# Patient Record
Sex: FEMALE | NOT HISPANIC OR LATINO | URBAN - METROPOLITAN AREA
[De-identification: names, ages, dates, MRNs, and addresses within clinical notes are randomized per-mention and may not be internally consistent; named-entity substitution may affect disease eponyms.]

---

## 2023-10-02 ENCOUNTER — NURSING HOME VISIT (OUTPATIENT)
Dept: POST ACUTE CARE | Facility: EXTERNAL LOCATION | Age: 73
End: 2023-10-02
Payer: COMMERCIAL

## 2023-10-02 DIAGNOSIS — I10 PRIMARY HYPERTENSION: ICD-10-CM

## 2023-10-02 DIAGNOSIS — N18.31 TYPE 2 DIABETES MELLITUS WITH STAGE 3A CHRONIC KIDNEY DISEASE, WITHOUT LONG-TERM CURRENT USE OF INSULIN (MULTI): Primary | ICD-10-CM

## 2023-10-02 DIAGNOSIS — E78.2 MIXED HYPERLIPIDEMIA: ICD-10-CM

## 2023-10-02 DIAGNOSIS — E11.22 TYPE 2 DIABETES MELLITUS WITH STAGE 3A CHRONIC KIDNEY DISEASE, WITHOUT LONG-TERM CURRENT USE OF INSULIN (MULTI): Primary | ICD-10-CM

## 2023-10-02 PROCEDURE — 99308 SBSQ NF CARE LOW MDM 20: CPT | Performed by: FAMILY MEDICINE

## 2023-11-03 ENCOUNTER — NURSING HOME VISIT (OUTPATIENT)
Dept: PRIMARY CARE | Facility: CLINIC | Age: 73
End: 2023-11-03
Payer: COMMERCIAL

## 2023-11-03 DIAGNOSIS — N18.32 TYPE 2 DIABETES MELLITUS WITH STAGE 3B CHRONIC KIDNEY DISEASE, WITH LONG-TERM CURRENT USE OF INSULIN (MULTI): Primary | ICD-10-CM

## 2023-11-03 DIAGNOSIS — F31.75 BIPOLAR DISORDER, IN PARTIAL REMISSION, MOST RECENT EPISODE DEPRESSED (MULTI): ICD-10-CM

## 2023-11-03 DIAGNOSIS — I89.0 LYMPHEDEMA: ICD-10-CM

## 2023-11-03 DIAGNOSIS — E11.22 TYPE 2 DIABETES MELLITUS WITH STAGE 3B CHRONIC KIDNEY DISEASE, WITH LONG-TERM CURRENT USE OF INSULIN (MULTI): Primary | ICD-10-CM

## 2023-11-03 DIAGNOSIS — I10 PRIMARY HYPERTENSION: ICD-10-CM

## 2023-11-03 DIAGNOSIS — Z79.4 TYPE 2 DIABETES MELLITUS WITH STAGE 3B CHRONIC KIDNEY DISEASE, WITH LONG-TERM CURRENT USE OF INSULIN (MULTI): Primary | ICD-10-CM

## 2023-11-03 PROCEDURE — 99309 SBSQ NF CARE MODERATE MDM 30: CPT | Performed by: FAMILY MEDICINE

## 2023-11-07 PROBLEM — F31.9 BIPOLAR MOOD DISORDER (MULTI): Status: ACTIVE | Noted: 2023-11-07

## 2023-11-07 PROBLEM — E78.5 HYPERLIPIDEMIA: Status: ACTIVE | Noted: 2023-11-07

## 2023-11-07 PROBLEM — I89.0 LYMPHEDEMA: Status: ACTIVE | Noted: 2023-11-07

## 2023-11-07 PROBLEM — I10 HYPERTENSION: Status: ACTIVE | Noted: 2023-11-07

## 2023-11-07 NOTE — PROGRESS NOTES
Subjective   Patient ID: Brianna Argueta is a 73 y.o. female who presents for No chief complaint on file..    HPI     Review of Systems    Objective   There were no vitals taken for this visit.    Physical Exam    Assessment/Plan   Problem List Items Addressed This Visit             ICD-10-CM    Bipolar mood disorder (CMS/[facility]) F31.9    Hypertension I10    Lymphedema I89.0     Other Visit Diagnoses         Codes    Type 2 diabetes mellitus with stage 3b chronic kidney disease, with long-term current use of insulin (CMS/[facility])    -  Primary E11.22, N18.32, Z79.4           This note is for billing purposes only.  See note at [facility] for complete documentation.

## 2023-12-01 ENCOUNTER — NURSING HOME VISIT (OUTPATIENT)
Dept: POST ACUTE CARE | Facility: EXTERNAL LOCATION | Age: 73
End: 2023-12-01
Payer: COMMERCIAL

## 2023-12-01 DIAGNOSIS — N18.32 TYPE 2 DIABETES MELLITUS WITH STAGE 3B CHRONIC KIDNEY DISEASE, WITHOUT LONG-TERM CURRENT USE OF INSULIN (MULTI): ICD-10-CM

## 2023-12-01 DIAGNOSIS — I10 PRIMARY HYPERTENSION: ICD-10-CM

## 2023-12-01 DIAGNOSIS — E78.2 MIXED HYPERLIPIDEMIA: ICD-10-CM

## 2023-12-01 DIAGNOSIS — N18.32 STAGE 3B CHRONIC KIDNEY DISEASE (MULTI): Primary | ICD-10-CM

## 2023-12-01 DIAGNOSIS — F31.75 BIPOLAR DISORDER, IN PARTIAL REMISSION, MOST RECENT EPISODE DEPRESSED (MULTI): ICD-10-CM

## 2023-12-01 DIAGNOSIS — E11.22 TYPE 2 DIABETES MELLITUS WITH STAGE 3B CHRONIC KIDNEY DISEASE, WITHOUT LONG-TERM CURRENT USE OF INSULIN (MULTI): ICD-10-CM

## 2023-12-01 PROCEDURE — 99309 SBSQ NF CARE MODERATE MDM 30: CPT | Performed by: FAMILY MEDICINE

## 2023-12-04 NOTE — PROGRESS NOTES
Subjective   Patient ID: Brianna Argueta is a 73 y.o. female who presents for long term visit.   HPI     Review of Systems    Objective   There were no vitals taken for this visit.    Physical Exam    Assessment/Plan   Problem List Items Addressed This Visit             ICD-10-CM    Bipolar mood disorder (CMS/[facility]) F31.9    Hyperlipidemia E78.5    Hypertension I10     Other Visit Diagnoses         Codes    Stage 3b chronic kidney disease (CMS/[facility])    -  Primary N18.32    Type 2 diabetes mellitus with stage 3b chronic kidney disease, without long-term current use of insulin (CMS/[facility])     E11.22, N18.32          Note is for billing purposes only see note at long-term care facility for complete documentation.

## 2024-01-05 ENCOUNTER — NURSING HOME VISIT (OUTPATIENT)
Dept: POST ACUTE CARE | Facility: EXTERNAL LOCATION | Age: 74
End: 2024-01-05
Payer: COMMERCIAL

## 2024-01-05 DIAGNOSIS — Z79.4 TYPE 2 DIABETES MELLITUS WITH STAGE 3B CHRONIC KIDNEY DISEASE, WITH LONG-TERM CURRENT USE OF INSULIN (MULTI): ICD-10-CM

## 2024-01-05 DIAGNOSIS — I10 PRIMARY HYPERTENSION: ICD-10-CM

## 2024-01-05 DIAGNOSIS — L03.115 BILATERAL LOWER LEG CELLULITIS: Primary | ICD-10-CM

## 2024-01-05 DIAGNOSIS — N18.32 STAGE 3B CHRONIC KIDNEY DISEASE (MULTI): ICD-10-CM

## 2024-01-05 DIAGNOSIS — N18.32 TYPE 2 DIABETES MELLITUS WITH STAGE 3B CHRONIC KIDNEY DISEASE, WITH LONG-TERM CURRENT USE OF INSULIN (MULTI): ICD-10-CM

## 2024-01-05 DIAGNOSIS — E11.22 TYPE 2 DIABETES MELLITUS WITH STAGE 3B CHRONIC KIDNEY DISEASE, WITH LONG-TERM CURRENT USE OF INSULIN (MULTI): ICD-10-CM

## 2024-01-05 DIAGNOSIS — L03.116 BILATERAL LOWER LEG CELLULITIS: Primary | ICD-10-CM

## 2024-01-05 PROCEDURE — 99309 SBSQ NF CARE MODERATE MDM 30: CPT | Performed by: FAMILY MEDICINE

## 2024-01-05 NOTE — LETTER
Patient: Brianna Argueta  : 1950    Encounter Date: 2024    Subjective  Patient ID: Brianna Argueta is a 73 y.o. female who is long term resident being seen and evaluated for multiple medical problems.    HPI     Review of Systems    Objective  There were no vitals taken for this visit.    Physical Exam    Assessment/Plan  Problem List Items Addressed This Visit             ICD-10-CM    Hypertension I10     Other Visit Diagnoses         Codes    Bilateral lower leg cellulitis    -  Primary L03.116, L03.115    Type 2 diabetes mellitus with stage 3b chronic kidney disease, with long-term current use of insulin (CMS/[facility])     E11.22, N18.32, Z79.4    Stage 3b chronic kidney disease (CMS/[facility])     N18.32        This note is for billing purposes only. For complete documentation, please see note at [facility] care facility.      Goals    None           Electronically Signed By: Mari Peters MD   24  1:40 PM

## 2024-01-08 NOTE — PROGRESS NOTES
Subjective   Patient ID: Brianna Argueta is a 73 y.o. female who is long term resident being seen and evaluated for multiple medical problems.    HPI     Review of Systems    Objective   There were no vitals taken for this visit.    Physical Exam    Assessment/Plan   Problem List Items Addressed This Visit             ICD-10-CM    Hypertension I10     Other Visit Diagnoses         Codes    Bilateral lower leg cellulitis    -  Primary L03.116, L03.115    Type 2 diabetes mellitus with stage 3b chronic kidney disease, with long-term current use of insulin (CMS/[facility])     E11.22, N18.32, Z79.4    Stage 3b chronic kidney disease (CMS/[facility])     N18.32        This note is for billing purposes only. For complete documentation, please see note at [facility] care facility.      Goals    None

## 2024-02-02 ENCOUNTER — NURSING HOME VISIT (OUTPATIENT)
Dept: POST ACUTE CARE | Facility: EXTERNAL LOCATION | Age: 74
End: 2024-02-02
Payer: COMMERCIAL

## 2024-02-02 DIAGNOSIS — R31.0 GROSS HEMATURIA: Primary | ICD-10-CM

## 2024-02-02 DIAGNOSIS — Z79.4 TYPE 2 DIABETES MELLITUS WITH STAGE 3B CHRONIC KIDNEY DISEASE, WITH LONG-TERM CURRENT USE OF INSULIN (MULTI): ICD-10-CM

## 2024-02-02 DIAGNOSIS — N18.32 STAGE 3B CHRONIC KIDNEY DISEASE (MULTI): ICD-10-CM

## 2024-02-02 DIAGNOSIS — N18.32 TYPE 2 DIABETES MELLITUS WITH STAGE 3B CHRONIC KIDNEY DISEASE, WITH LONG-TERM CURRENT USE OF INSULIN (MULTI): ICD-10-CM

## 2024-02-02 DIAGNOSIS — E11.22 TYPE 2 DIABETES MELLITUS WITH STAGE 3B CHRONIC KIDNEY DISEASE, WITH LONG-TERM CURRENT USE OF INSULIN (MULTI): ICD-10-CM

## 2024-02-02 PROCEDURE — 99309 SBSQ NF CARE MODERATE MDM 30: CPT | Performed by: FAMILY MEDICINE

## 2024-02-02 NOTE — LETTER
Patient: Brianna Argueta  : 1950    Encounter Date: 2024    Subjective  Patient ID: Brianna Argueta is a 74 y.o. female who is long term resident being seen and evaluated for multiple medical problems.    HPI     Review of Systems    Objective  There were no vitals taken for this visit.    Physical Exam    Assessment/Plan  Problem List Items Addressed This Visit    None  Visit Diagnoses         Codes    Gross hematuria    -  Primary R31.0    Type 2 diabetes mellitus with stage 3b chronic kidney disease, with long-term current use of insulin (CMS/[facility])     E11.22, N18.32, Z79.4    Stage 3b chronic kidney disease (CMS/[facility])     N18.32        This note is for billing purposes only. For complete documentation please see note at facility.      Goals    None           Electronically Signed By: Mari Peters MD   24  2:26 PM

## 2024-02-05 NOTE — PROGRESS NOTES
Subjective   Patient ID: Brianna Argueta is a 74 y.o. female who is long term resident being seen and evaluated for multiple medical problems.    HPI     Review of Systems    Objective   There were no vitals taken for this visit.    Physical Exam    Assessment/Plan   Problem List Items Addressed This Visit    None  Visit Diagnoses         Codes    Gross hematuria    -  Primary R31.0    Type 2 diabetes mellitus with stage 3b chronic kidney disease, with long-term current use of insulin (CMS/[facility])     E11.22, N18.32, Z79.4    Stage 3b chronic kidney disease (CMS/[facility])     N18.32        This note is for billing purposes only. For complete documentation please see note at facility.      Goals    None

## 2024-03-01 ENCOUNTER — NURSING HOME VISIT (OUTPATIENT)
Dept: POST ACUTE CARE | Facility: EXTERNAL LOCATION | Age: 74
End: 2024-03-01
Payer: COMMERCIAL

## 2024-03-01 DIAGNOSIS — F20.0 PARANOID SCHIZOPHRENIA (MULTI): Primary | ICD-10-CM

## 2024-03-01 DIAGNOSIS — E78.2 MIXED HYPERLIPIDEMIA: ICD-10-CM

## 2024-03-01 DIAGNOSIS — N18.31 STAGE 3A CHRONIC KIDNEY DISEASE (MULTI): ICD-10-CM

## 2024-03-01 DIAGNOSIS — I10 PRIMARY HYPERTENSION: ICD-10-CM

## 2024-03-01 DIAGNOSIS — I89.0 LYMPHEDEMA: ICD-10-CM

## 2024-03-01 PROCEDURE — 99309 SBSQ NF CARE MODERATE MDM 30: CPT | Performed by: FAMILY MEDICINE

## 2024-03-01 NOTE — LETTER
Patient: Brianna Argueta  : 1950    Encounter Date: 2024    Subjective  Patient ID: Brianna Argueta is a 74 y.o. female who is long term resident being seen and evaluated for multiple medical problems.    HPI     Review of Systems    Objective  There were no vitals taken for this visit.    Physical Exam    Assessment/Plan  Problem List Items Addressed This Visit             ICD-10-CM    Hyperlipidemia E78.5    Hypertension I10    Lymphedema I89.0    Paranoid schizophrenia (CMS/[facility]) - Primary F20.0     Other Visit Diagnoses         Codes    Stage 3a chronic kidney disease (CMS/[facility])     N18.31        This note is being done for billing purposes only.  For complete documentation please see note at the facility.     Goals    None           Electronically Signed By: Mari Peters MD   3/4/24 12:41 PM

## 2024-03-04 PROBLEM — F20.0 PARANOID SCHIZOPHRENIA (MULTI): Status: ACTIVE | Noted: 2024-03-04

## 2024-03-04 NOTE — PROGRESS NOTES
Subjective   Patient ID: Brianna Argueta is a 74 y.o. female who is long term resident being seen and evaluated for multiple medical problems.    HPI     Review of Systems    Objective   There were no vitals taken for this visit.    Physical Exam    Assessment/Plan   Problem List Items Addressed This Visit             ICD-10-CM    Hyperlipidemia E78.5    Hypertension I10    Lymphedema I89.0    Paranoid schizophrenia (CMS/[facility]) - Primary F20.0     Other Visit Diagnoses         Codes    Stage 3a chronic kidney disease (CMS/[facility])     N18.31        This note is being done for billing purposes only.  For complete documentation please see note at the facility.     Goals    None

## 2024-04-01 ENCOUNTER — NURSING HOME VISIT (OUTPATIENT)
Dept: POST ACUTE CARE | Facility: EXTERNAL LOCATION | Age: 74
End: 2024-04-01
Payer: COMMERCIAL

## 2024-04-01 DIAGNOSIS — Z79.4 TYPE 2 DIABETES MELLITUS WITH HYPERGLYCEMIA, WITH LONG-TERM CURRENT USE OF INSULIN (MULTI): Primary | ICD-10-CM

## 2024-04-01 DIAGNOSIS — I10 PRIMARY HYPERTENSION: ICD-10-CM

## 2024-04-01 DIAGNOSIS — F31.75 BIPOLAR DISORDER, IN PARTIAL REMISSION, MOST RECENT EPISODE DEPRESSED (MULTI): ICD-10-CM

## 2024-04-01 DIAGNOSIS — E11.65 TYPE 2 DIABETES MELLITUS WITH HYPERGLYCEMIA, WITH LONG-TERM CURRENT USE OF INSULIN (MULTI): Primary | ICD-10-CM

## 2024-04-01 DIAGNOSIS — N18.32 STAGE 3B CHRONIC KIDNEY DISEASE (MULTI): ICD-10-CM

## 2024-04-01 DIAGNOSIS — F20.0 PARANOID SCHIZOPHRENIA (MULTI): ICD-10-CM

## 2024-04-01 DIAGNOSIS — E78.2 MIXED HYPERLIPIDEMIA: ICD-10-CM

## 2024-04-01 PROCEDURE — 99309 SBSQ NF CARE MODERATE MDM 30: CPT | Performed by: FAMILY MEDICINE

## 2024-04-01 NOTE — LETTER
Patient: Brianna Argueta  : 1950    Encounter Date: 2024    Subjective  Patient ID: Brianna Argueta is a 74 y.o. female who is long term resident being seen and evaluated for multiple medical problems.    HPI     Review of Systems    Objective  There were no vitals taken for this visit.    Physical Exam    Assessment/Plan  Problem List Items Addressed This Visit             ICD-10-CM    Bipolar mood disorder (CMS/[facility]) F31.9    Hyperlipidemia E78.5    Hypertension I10    Paranoid schizophrenia (CMS/[facility]) F20.0     Other Visit Diagnoses         Codes    Type 2 diabetes mellitus with hyperglycemia, with long-term current use of insulin (CMS/[facility])    -  Primary E11.65, Z79.4    Stage 3b chronic kidney disease (CMS/[facility])     N18.32        This note is for billing purposes only. For complete documentation please see note at the facility.    Goals         Goals    None           Electronically Signed By: Mari Peters MD   24  1:17 PM

## 2024-04-02 NOTE — PROGRESS NOTES
Subjective   Patient ID: Brianna Argueta is a 74 y.o. female who is long term resident being seen and evaluated for multiple medical problems.    HPI     Review of Systems    Objective   There were no vitals taken for this visit.    Physical Exam    Assessment/Plan   Problem List Items Addressed This Visit             ICD-10-CM    Bipolar mood disorder (CMS/[facility]) F31.9    Hyperlipidemia E78.5    Hypertension I10    Paranoid schizophrenia (CMS/[facility]) F20.0     Other Visit Diagnoses         Codes    Type 2 diabetes mellitus with hyperglycemia, with long-term current use of insulin (CMS/[facility])    -  Primary E11.65, Z79.4    Stage 3b chronic kidney disease (CMS/[facility])     N18.32        This note is for billing purposes only. For complete documentation please see note at the facility.    Goals         Goals    None

## 2024-05-03 ENCOUNTER — NURSING HOME VISIT (OUTPATIENT)
Dept: POST ACUTE CARE | Facility: EXTERNAL LOCATION | Age: 74
End: 2024-05-03
Payer: COMMERCIAL

## 2024-05-03 DIAGNOSIS — E78.2 MIXED HYPERLIPIDEMIA: ICD-10-CM

## 2024-05-03 DIAGNOSIS — D63.1 ANEMIA DUE TO STAGE 3A CHRONIC KIDNEY DISEASE (MULTI): ICD-10-CM

## 2024-05-03 DIAGNOSIS — I10 PRIMARY HYPERTENSION: ICD-10-CM

## 2024-05-03 DIAGNOSIS — E11.65 TYPE 2 DIABETES MELLITUS WITH HYPERGLYCEMIA, WITH LONG-TERM CURRENT USE OF INSULIN (MULTI): Primary | ICD-10-CM

## 2024-05-03 DIAGNOSIS — Z79.4 TYPE 2 DIABETES MELLITUS WITH HYPERGLYCEMIA, WITH LONG-TERM CURRENT USE OF INSULIN (MULTI): Primary | ICD-10-CM

## 2024-05-03 DIAGNOSIS — N18.31 STAGE 3A CHRONIC KIDNEY DISEASE (MULTI): ICD-10-CM

## 2024-05-03 DIAGNOSIS — N18.31 ANEMIA DUE TO STAGE 3A CHRONIC KIDNEY DISEASE (MULTI): ICD-10-CM

## 2024-05-03 DIAGNOSIS — F20.0 PARANOID SCHIZOPHRENIA (MULTI): ICD-10-CM

## 2024-05-03 PROCEDURE — 99309 SBSQ NF CARE MODERATE MDM 30: CPT | Performed by: FAMILY MEDICINE

## 2024-05-03 NOTE — LETTER
Patient: Brianna Argueta  : 1950    Encounter Date: 2024    Subjective  Patient ID: Brianna Argueta is a 74 y.o. female who is long term resident being seen and evaluated for multiple medical problems.    HPI     Review of Systems    Objective  There were no vitals taken for this visit.    Physical Exam    Assessment/Plan  Problem List Items Addressed This Visit             ICD-10-CM    Hyperlipidemia E78.5    Hypertension I10    Paranoid schizophrenia (Multi) F20.0     Other Visit Diagnoses         Codes    Type 2 diabetes mellitus with hyperglycemia, with long-term current use of insulin (Multi)    -  Primary E11.65, Z79.4    Stage 3a chronic kidney disease (Multi)     N18.31    Anemia due to stage 3a chronic kidney disease (Multi)     N18.31, D63.1        Please see complete documentation scanned under media      Goals    None           Electronically Signed By: Mari Peters MD   24 12:24 PM

## 2024-05-06 NOTE — PROGRESS NOTES
Subjective   Patient ID: Brianna Argueta is a 74 y.o. female who is long term resident being seen and evaluated for multiple medical problems.    HPI     Review of Systems    Objective   There were no vitals taken for this visit.    Physical Exam    Assessment/Plan   Problem List Items Addressed This Visit             ICD-10-CM    Hyperlipidemia E78.5    Hypertension I10    Paranoid schizophrenia (Multi) F20.0     Other Visit Diagnoses         Codes    Type 2 diabetes mellitus with hyperglycemia, with long-term current use of insulin (Multi)    -  Primary E11.65, Z79.4    Stage 3a chronic kidney disease (Multi)     N18.31    Anemia due to stage 3a chronic kidney disease (Multi)     N18.31, D63.1        Please see complete documentation scanned under media      Goals    None

## 2024-06-03 ENCOUNTER — NURSING HOME VISIT (OUTPATIENT)
Dept: POST ACUTE CARE | Facility: EXTERNAL LOCATION | Age: 74
End: 2024-06-03
Payer: COMMERCIAL

## 2024-06-03 DIAGNOSIS — Z79.4 TYPE 2 DIABETES MELLITUS WITH STAGE 3B CHRONIC KIDNEY DISEASE, WITH LONG-TERM CURRENT USE OF INSULIN (MULTI): ICD-10-CM

## 2024-06-03 DIAGNOSIS — I89.0 LYMPHEDEMA: Primary | ICD-10-CM

## 2024-06-03 DIAGNOSIS — I10 PRIMARY HYPERTENSION: ICD-10-CM

## 2024-06-03 DIAGNOSIS — E11.22 TYPE 2 DIABETES MELLITUS WITH STAGE 3B CHRONIC KIDNEY DISEASE, WITH LONG-TERM CURRENT USE OF INSULIN (MULTI): ICD-10-CM

## 2024-06-03 DIAGNOSIS — N18.32 TYPE 2 DIABETES MELLITUS WITH STAGE 3B CHRONIC KIDNEY DISEASE, WITH LONG-TERM CURRENT USE OF INSULIN (MULTI): ICD-10-CM

## 2024-06-03 DIAGNOSIS — F20.0 PARANOID SCHIZOPHRENIA (MULTI): ICD-10-CM

## 2024-06-03 PROCEDURE — 99309 SBSQ NF CARE MODERATE MDM 30: CPT | Performed by: FAMILY MEDICINE

## 2024-06-03 NOTE — LETTER
Patient: Brianna Argueta  : 1950    Encounter Date: 2024    Subjective  Patient ID: Brianna Argueta is a 74 y.o. female who is long term resident being seen and evaluated for multiple medical problems.    HPI patient being seen for monthly visit.  She did have laboratory studies  since last visit that showed A1c of 7.6 with hemoglobin 12.7 and chronic mild elevation in white count noted consistent with her recurrent cellulitic issues.  GFR has remained stable at 54.  She continues to exhibit paranoid schizophrenic symptoms and psychiatry is following.  For recheck laboratory studies in August or possibly defer until September.     Review of Systems   Constitutional:  Positive for fatigue. Negative for chills and fever.   HENT:  Positive for hearing loss. Negative for congestion and sore throat.    Eyes:  Negative for visual disturbance.   Respiratory:  Positive for shortness of breath (At baseline). Negative for cough and wheezing.    Cardiovascular:  Positive for leg swelling. Negative for chest pain and palpitations.   Gastrointestinal:  Negative for abdominal pain, constipation, diarrhea, nausea and vomiting.   Genitourinary:  Negative for dysuria, frequency, hematuria and urgency.   Musculoskeletal:  Positive for gait problem.   Skin:  Negative for rash.   Neurological:  Negative for dizziness, syncope and headaches.   Psychiatric/Behavioral:  Positive for confusion. Negative for dysphoric mood. The patient is not nervous/anxious.        Objective  There were no vitals taken for this visit.    Physical Exam  Vitals reviewed: 128/78 temp 97.8 pulse 78 respirations 18 weight 209.   Constitutional:       General: She is not in acute distress.     Appearance: She is not ill-appearing.   HENT:      Head: Normocephalic.      Nose: No congestion.      Mouth/Throat:      Mouth: Mucous membranes are dry.      Pharynx: Oropharynx is clear.   Eyes:      General: No scleral icterus.     Conjunctiva/sclera:  Conjunctivae normal.   Cardiovascular:      Rate and Rhythm: Normal rate and regular rhythm.      Heart sounds:      No gallop.   Pulmonary:      Effort: Pulmonary effort is normal.      Breath sounds: No wheezing or rhonchi.   Abdominal:      General: Bowel sounds are normal.      Tenderness: There is no abdominal tenderness. There is no rebound.   Musculoskeletal:      Cervical back: Neck supple.      Right lower leg: Edema present.      Left lower leg: Edema present.   Lymphadenopathy:      Cervical: No cervical adenopathy.   Skin:     General: Skin is warm and dry.   Neurological:      General: No focal deficit present.         Assessment/Plan  Problem List Items Addressed This Visit             ICD-10-CM    Hypertension I10    Lymphedema - Primary I89.0    Paranoid schizophrenia (Multi) F20.0     Other Visit Diagnoses         Codes    Type 2 diabetes mellitus with stage 3b chronic kidney disease, with long-term current use of insulin (Multi)     E11.22, N18.32, Z79.4        Follow-up with psychiatry regarding any medication adjustments needed for paranoid schizophrenia, monitor GFR which shows some slight improvement with improved diabetic management noted as well.  Plan for recheck laboratory studies in August or possibly defer until September.     Goals    None           Electronically Signed By: Mari Peters MD   6/4/24 12:48 PM   chills

## 2024-06-04 ASSESSMENT — ENCOUNTER SYMPTOMS
VOMITING: 0
DYSURIA: 0
CONSTIPATION: 0
COUGH: 0
NAUSEA: 0
DIZZINESS: 0
SORE THROAT: 0
PALPITATIONS: 0
NERVOUS/ANXIOUS: 0
SHORTNESS OF BREATH: 1
HEMATURIA: 0
DIARRHEA: 0
HEADACHES: 0
FEVER: 0
WHEEZING: 0
FATIGUE: 1
CHILLS: 0
DYSPHORIC MOOD: 0
CONFUSION: 1
ABDOMINAL PAIN: 0
FREQUENCY: 0

## 2024-06-04 NOTE — PROGRESS NOTES
Subjective   Patient ID: Brianna Argueta is a 74 y.o. female who is long term resident being seen and evaluated for multiple medical problems.    HPI patient being seen for monthly visit.  She did have laboratory studies  since last visit that showed A1c of 7.6 with hemoglobin 12.7 and chronic mild elevation in white count noted consistent with her recurrent cellulitic issues.  GFR has remained stable at 54.  She continues to exhibit paranoid schizophrenic symptoms and psychiatry is following.  For recheck laboratory studies in August or possibly defer until September.     Review of Systems   Constitutional:  Positive for fatigue. Negative for chills and fever.   HENT:  Positive for hearing loss. Negative for congestion and sore throat.    Eyes:  Negative for visual disturbance.   Respiratory:  Positive for shortness of breath (At baseline). Negative for cough and wheezing.    Cardiovascular:  Positive for leg swelling. Negative for chest pain and palpitations.   Gastrointestinal:  Negative for abdominal pain, constipation, diarrhea, nausea and vomiting.   Genitourinary:  Negative for dysuria, frequency, hematuria and urgency.   Musculoskeletal:  Positive for gait problem.   Skin:  Negative for rash.   Neurological:  Negative for dizziness, syncope and headaches.   Psychiatric/Behavioral:  Positive for confusion. Negative for dysphoric mood. The patient is not nervous/anxious.        Objective   There were no vitals taken for this visit.    Physical Exam  Vitals reviewed: 128/78 temp 97.8 pulse 78 respirations 18 weight 209.   Constitutional:       General: She is not in acute distress.     Appearance: She is not ill-appearing.   HENT:      Head: Normocephalic.      Nose: No congestion.      Mouth/Throat:      Mouth: Mucous membranes are dry.      Pharynx: Oropharynx is clear.   Eyes:      General: No scleral icterus.     Conjunctiva/sclera: Conjunctivae normal.   Cardiovascular:      Rate and Rhythm: Normal rate and  regular rhythm.      Heart sounds:      No gallop.   Pulmonary:      Effort: Pulmonary effort is normal.      Breath sounds: No wheezing or rhonchi.   Abdominal:      General: Bowel sounds are normal.      Tenderness: There is no abdominal tenderness. There is no rebound.   Musculoskeletal:      Cervical back: Neck supple.      Right lower leg: Edema present.      Left lower leg: Edema present.   Lymphadenopathy:      Cervical: No cervical adenopathy.   Skin:     General: Skin is warm and dry.   Neurological:      General: No focal deficit present.         Assessment/Plan   Problem List Items Addressed This Visit             ICD-10-CM    Hypertension I10    Lymphedema - Primary I89.0    Paranoid schizophrenia (Multi) F20.0     Other Visit Diagnoses         Codes    Type 2 diabetes mellitus with stage 3b chronic kidney disease, with long-term current use of insulin (Multi)     E11.22, N18.32, Z79.4        Follow-up with psychiatry regarding any medication adjustments needed for paranoid schizophrenia, monitor GFR which shows some slight improvement with improved diabetic management noted as well.  Plan for recheck laboratory studies in August or possibly defer until September.     Goals    None

## 2024-07-01 ENCOUNTER — NURSING HOME VISIT (OUTPATIENT)
Dept: POST ACUTE CARE | Facility: EXTERNAL LOCATION | Age: 74
End: 2024-07-01
Payer: COMMERCIAL

## 2024-07-01 DIAGNOSIS — I89.0 LYMPHEDEMA: Primary | ICD-10-CM

## 2024-07-01 DIAGNOSIS — F20.0 PARANOID SCHIZOPHRENIA (MULTI): ICD-10-CM

## 2024-07-01 DIAGNOSIS — E78.2 MIXED HYPERLIPIDEMIA: ICD-10-CM

## 2024-07-01 DIAGNOSIS — N18.32 TYPE 2 DIABETES MELLITUS WITH STAGE 3B CHRONIC KIDNEY DISEASE, WITH LONG-TERM CURRENT USE OF INSULIN (MULTI): ICD-10-CM

## 2024-07-01 DIAGNOSIS — I10 PRIMARY HYPERTENSION: ICD-10-CM

## 2024-07-01 DIAGNOSIS — Z79.4 TYPE 2 DIABETES MELLITUS WITH STAGE 3B CHRONIC KIDNEY DISEASE, WITH LONG-TERM CURRENT USE OF INSULIN (MULTI): ICD-10-CM

## 2024-07-01 DIAGNOSIS — E11.22 TYPE 2 DIABETES MELLITUS WITH STAGE 3B CHRONIC KIDNEY DISEASE, WITH LONG-TERM CURRENT USE OF INSULIN (MULTI): ICD-10-CM

## 2024-07-01 PROCEDURE — 99309 SBSQ NF CARE MODERATE MDM 30: CPT | Performed by: FAMILY MEDICINE

## 2024-07-01 NOTE — LETTER
Patient: Brianna Argueta  : 1950    Encounter Date: 2024    Subjective  Patient ID: Brianna Argueta is a 74 y.o. female who is long term resident being seen and evaluated for multiple medical problems.    HPI patient being seen for monthly visit.  She did have laboratory studies  since last visit that showed A1c of 7.6 with hemoglobin 12.7 and chronic mild elevation in white count noted consistent with her recurrent cellulitic issues.  GFR has remained stable at 54.  She continues to exhibit paranoid schizophrenic symptoms and psychiatry is following.  She has rechecks ordered for laboratory studies including CBC CMP and A1c.  Staff reports no other new issues or concerns today.      Review of Systems   Constitutional:  Positive for fatigue. Negative for chills and fever.   HENT:  Positive for hearing loss. Negative for congestion and sore throat.    Eyes:  Negative for visual disturbance.   Respiratory:  Negative for cough, shortness of breath (At baseline) and wheezing.    Cardiovascular:  Positive for leg swelling. Negative for chest pain and palpitations.   Gastrointestinal:  Negative for abdominal pain, constipation, diarrhea, nausea and vomiting.   Genitourinary:  Negative for dysuria, frequency, hematuria and urgency.   Musculoskeletal:  Positive for gait problem.   Skin:  Negative for rash.   Neurological:  Negative for dizziness, syncope and headaches.   Psychiatric/Behavioral:  Positive for confusion. Negative for dysphoric mood. The patient is not nervous/anxious.        Objective  There were no vitals taken for this visit.    Physical Exam  Vitals reviewed: 128/78 temp 97.8 pulse 78 respirations 18 weight 211 which is up 2 pounds.   Constitutional:       General: She is not in acute distress.     Appearance: She is not ill-appearing.   HENT:      Head: Normocephalic.      Nose: No congestion.      Mouth/Throat:      Mouth: Mucous membranes are dry.      Pharynx: Oropharynx is clear.   Eyes:       General: No scleral icterus.     Conjunctiva/sclera: Conjunctivae normal.   Cardiovascular:      Rate and Rhythm: Normal rate and regular rhythm.      Heart sounds:      No gallop.   Pulmonary:      Effort: Pulmonary effort is normal.      Breath sounds: No wheezing or rhonchi.   Abdominal:      General: Bowel sounds are normal.      Tenderness: There is no abdominal tenderness. There is no rebound.   Musculoskeletal:      Cervical back: Neck supple.      Right lower leg: Edema present.      Left lower leg: Edema present.   Lymphadenopathy:      Cervical: No cervical adenopathy.   Skin:     General: Skin is warm and dry.   Neurological:      Mental Status: Mental status is at baseline.      Motor: Weakness present.      Gait: Gait abnormal.         Assessment/Plan  Problem List Items Addressed This Visit             ICD-10-CM    Hyperlipidemia E78.5    Hypertension I10    Lymphedema - Primary I89.0    Paranoid schizophrenia (Multi) F20.0     Other Visit Diagnoses         Codes    Type 2 diabetes mellitus with stage 3b chronic kidney disease, with long-term current use of insulin (Multi)     E11.22, N18.32, Z79.4          Follow-up with psychiatry regarding any medication adjustments needed for paranoid schizophrenia, monitor GFR -CBC CMP A1c ordered for the end of this month      Goals    None           Electronically Signed By: Mari Peters MD   7/2/24 12:28 PM

## 2024-07-02 ASSESSMENT — ENCOUNTER SYMPTOMS
FREQUENCY: 0
CHILLS: 0
SHORTNESS OF BREATH: 0
DIZZINESS: 0
WHEEZING: 0
ABDOMINAL PAIN: 0
COUGH: 0
FEVER: 0
CONSTIPATION: 0
FATIGUE: 1
HEMATURIA: 0
SORE THROAT: 0
DYSPHORIC MOOD: 0
CONFUSION: 1
PALPITATIONS: 0
VOMITING: 0
HEADACHES: 0
DIARRHEA: 0
NAUSEA: 0
NERVOUS/ANXIOUS: 0
DYSURIA: 0

## 2024-07-02 NOTE — PROGRESS NOTES
Subjective   Patient ID: Brianna Argueta is a 74 y.o. female who is long term resident being seen and evaluated for multiple medical problems.    HPI patient being seen for monthly visit.  She did have laboratory studies  since last visit that showed A1c of 7.6 with hemoglobin 12.7 and chronic mild elevation in white count noted consistent with her recurrent cellulitic issues.  GFR has remained stable at 54.  She continues to exhibit paranoid schizophrenic symptoms and psychiatry is following.  She has rechecks ordered for laboratory studies including CBC CMP and A1c.  Staff reports no other new issues or concerns today.      Review of Systems   Constitutional:  Positive for fatigue. Negative for chills and fever.   HENT:  Positive for hearing loss. Negative for congestion and sore throat.    Eyes:  Negative for visual disturbance.   Respiratory:  Negative for cough, shortness of breath (At baseline) and wheezing.    Cardiovascular:  Positive for leg swelling. Negative for chest pain and palpitations.   Gastrointestinal:  Negative for abdominal pain, constipation, diarrhea, nausea and vomiting.   Genitourinary:  Negative for dysuria, frequency, hematuria and urgency.   Musculoskeletal:  Positive for gait problem.   Skin:  Negative for rash.   Neurological:  Negative for dizziness, syncope and headaches.   Psychiatric/Behavioral:  Positive for confusion. Negative for dysphoric mood. The patient is not nervous/anxious.        Objective   There were no vitals taken for this visit.    Physical Exam  Vitals reviewed: 128/78 temp 97.8 pulse 78 respirations 18 weight 211 which is up 2 pounds.   Constitutional:       General: She is not in acute distress.     Appearance: She is not ill-appearing.   HENT:      Head: Normocephalic.      Nose: No congestion.      Mouth/Throat:      Mouth: Mucous membranes are dry.      Pharynx: Oropharynx is clear.   Eyes:      General: No scleral icterus.     Conjunctiva/sclera: Conjunctivae  normal.   Cardiovascular:      Rate and Rhythm: Normal rate and regular rhythm.      Heart sounds:      No gallop.   Pulmonary:      Effort: Pulmonary effort is normal.      Breath sounds: No wheezing or rhonchi.   Abdominal:      General: Bowel sounds are normal.      Tenderness: There is no abdominal tenderness. There is no rebound.   Musculoskeletal:      Cervical back: Neck supple.      Right lower leg: Edema present.      Left lower leg: Edema present.   Lymphadenopathy:      Cervical: No cervical adenopathy.   Skin:     General: Skin is warm and dry.   Neurological:      Mental Status: Mental status is at baseline.      Motor: Weakness present.      Gait: Gait abnormal.         Assessment/Plan   Problem List Items Addressed This Visit             ICD-10-CM    Hyperlipidemia E78.5    Hypertension I10    Lymphedema - Primary I89.0    Paranoid schizophrenia (Multi) F20.0     Other Visit Diagnoses         Codes    Type 2 diabetes mellitus with stage 3b chronic kidney disease, with long-term current use of insulin (Multi)     E11.22, N18.32, Z79.4          Follow-up with psychiatry regarding any medication adjustments needed for paranoid schizophrenia, monitor GFR -CBC CMP A1c ordered for the end of this month      Goals    None

## 2024-07-31 ENCOUNTER — NURSING HOME VISIT (OUTPATIENT)
Dept: POST ACUTE CARE | Facility: EXTERNAL LOCATION | Age: 74
End: 2024-07-31
Payer: COMMERCIAL

## 2024-07-31 VITALS
TEMPERATURE: 98 F | OXYGEN SATURATION: 96 % | SYSTOLIC BLOOD PRESSURE: 133 MMHG | BODY MASS INDEX: 36.83 KG/M2 | DIASTOLIC BLOOD PRESSURE: 63 MMHG | WEIGHT: 207.9 LBS | HEART RATE: 61 BPM | RESPIRATION RATE: 17 BRPM

## 2024-07-31 DIAGNOSIS — R05.2 SUBACUTE COUGH: Primary | ICD-10-CM

## 2024-07-31 PROCEDURE — 99309 SBSQ NF CARE MODERATE MDM 30: CPT | Performed by: NURSE PRACTITIONER

## 2024-07-31 RX ORDER — PALIPERIDONE 3 MG/1
3 TABLET, EXTENDED RELEASE ORAL EVERY MORNING
COMMUNITY

## 2024-07-31 RX ORDER — INSULIN ASPART 100 [IU]/ML
42 INJECTION, SOLUTION INTRAVENOUS; SUBCUTANEOUS
COMMUNITY

## 2024-07-31 RX ORDER — ATORVASTATIN CALCIUM 20 MG/1
20 TABLET, FILM COATED ORAL DAILY
COMMUNITY

## 2024-07-31 RX ORDER — METFORMIN HYDROCHLORIDE 1000 MG/1
1000 TABLET ORAL
COMMUNITY

## 2024-07-31 RX ORDER — AMANTADINE HYDROCHLORIDE 100 MG/1
100 CAPSULE, GELATIN COATED ORAL 2 TIMES DAILY
COMMUNITY

## 2024-07-31 RX ORDER — ASPIRIN 81 MG/1
81 TABLET ORAL DAILY
COMMUNITY

## 2024-07-31 RX ORDER — CARVEDILOL 25 MG/1
25 TABLET ORAL
COMMUNITY

## 2024-07-31 RX ORDER — FUROSEMIDE 20 MG/1
60 TABLET ORAL DAILY
COMMUNITY

## 2024-07-31 RX ORDER — GLIMEPIRIDE 2 MG/1
2 TABLET ORAL 2 TIMES DAILY
COMMUNITY

## 2024-07-31 ASSESSMENT — ENCOUNTER SYMPTOMS
WHEEZING: 0
FATIGUE: 0
OCCASIONAL FEELINGS OF UNSTEADINESS: 0
COUGH: 1
SHORTNESS OF BREATH: 0
LOSS OF SENSATION IN FEET: 0
FEVER: 0
DEPRESSION: 0

## 2024-07-31 NOTE — PROGRESS NOTES
"Subjective   Patient ID: Brianna Argueta is a 74 y.o. female who presents for Cough.    Following up with patient who has had a cough for the last few days. She has tested negative for COVID-19. She states that she feels \"fine\" She has not had a fever or chills. Her appetite has been good. She sleeps in bed frequently throughout the day. No treatments tried         Review of Systems   Constitutional:  Negative for fatigue and fever.   Respiratory:  Positive for cough. Negative for shortness of breath and wheezing.    Cardiovascular:  Negative for chest pain.       Objective   /63   Pulse 61   Temp 36.7 °C (98 °F)   Resp 17   Wt 94.3 kg (207 lb 14.4 oz)   SpO2 96%   BMI 36.83 kg/m²     Physical Exam  Constitutional:       Appearance: She is obese.   HENT:      Nose: Nose normal.      Mouth/Throat:      Pharynx: Oropharynx is clear.   Eyes:      Conjunctiva/sclera: Conjunctivae normal.   Cardiovascular:      Rate and Rhythm: Normal rate and regular rhythm.   Pulmonary:      Effort: Pulmonary effort is normal.      Breath sounds: Normal breath sounds.      Comments: Moist nonproductive cough  Abdominal:      General: Bowel sounds are normal.      Palpations: Abdomen is soft.   Skin:     General: Skin is warm and dry.   Neurological:      Mental Status: She is alert.   Psychiatric:         Mood and Affect: Affect is flat.         Assessment/Plan   Diagnoses and all orders for this visit:  Subacute cough  Comments:  guaifenesin 400mg po TID for 5 days, monitor for symptoms       "

## 2024-07-31 NOTE — LETTER
"Patient: Brianna Argueta  : 1950    Encounter Date: 2024    Subjective  Patient ID: Brianna Argueta is a 74 y.o. female who presents for Cough.    Following up with patient who has had a cough for the last few days. She has tested negative for COVID-19. She states that she feels \"fine\" She has not had a fever or chills. Her appetite has been good. She sleeps in bed frequently throughout the day. No treatments tried         Review of Systems   Constitutional:  Negative for fatigue and fever.   Respiratory:  Positive for cough. Negative for shortness of breath and wheezing.    Cardiovascular:  Negative for chest pain.       Objective  /63   Pulse 61   Temp 36.7 °C (98 °F)   Resp 17   Wt 94.3 kg (207 lb 14.4 oz)   SpO2 96%   BMI 36.83 kg/m²     Physical Exam  Constitutional:       Appearance: She is obese.   HENT:      Nose: Nose normal.      Mouth/Throat:      Pharynx: Oropharynx is clear.   Eyes:      Conjunctiva/sclera: Conjunctivae normal.   Cardiovascular:      Rate and Rhythm: Normal rate and regular rhythm.   Pulmonary:      Effort: Pulmonary effort is normal.      Breath sounds: Normal breath sounds.      Comments: Moist nonproductive cough  Abdominal:      General: Bowel sounds are normal.      Palpations: Abdomen is soft.   Skin:     General: Skin is warm and dry.   Neurological:      Mental Status: She is alert.   Psychiatric:         Mood and Affect: Affect is flat.         Assessment/Plan  Diagnoses and all orders for this visit:  Subacute cough  Comments:  guaifenesin 400mg po TID for 5 days, monitor for symptoms           Electronically Signed By: MEAGAN Banerjee   24 12:56 PM  "

## 2024-08-02 ENCOUNTER — NURSING HOME VISIT (OUTPATIENT)
Dept: POST ACUTE CARE | Facility: EXTERNAL LOCATION | Age: 74
End: 2024-08-02
Payer: COMMERCIAL

## 2024-08-02 DIAGNOSIS — I10 PRIMARY HYPERTENSION: ICD-10-CM

## 2024-08-02 DIAGNOSIS — Z79.4 TYPE 2 DIABETES MELLITUS WITH HYPERGLYCEMIA, WITH LONG-TERM CURRENT USE OF INSULIN (MULTI): ICD-10-CM

## 2024-08-02 DIAGNOSIS — E11.65 TYPE 2 DIABETES MELLITUS WITH HYPERGLYCEMIA, WITH LONG-TERM CURRENT USE OF INSULIN (MULTI): ICD-10-CM

## 2024-08-02 DIAGNOSIS — U07.1 COVID-19: Primary | ICD-10-CM

## 2024-08-02 DIAGNOSIS — F20.0 PARANOID SCHIZOPHRENIA (MULTI): ICD-10-CM

## 2024-08-02 DIAGNOSIS — N18.32 STAGE 3B CHRONIC KIDNEY DISEASE (MULTI): ICD-10-CM

## 2024-08-02 PROCEDURE — 99309 SBSQ NF CARE MODERATE MDM 30: CPT | Performed by: FAMILY MEDICINE

## 2024-08-02 NOTE — LETTER
Patient: Brianna Argueta  : 1950    Encounter Date: 2024    Subjective  Patient ID: Brianna Argueta is a 74 y.o. female who is long term resident being seen and evaluated for multiple medical problems.    HPI patient being seen for monthly visit.  She did have laboratory studies end of July showing gfr 54 with hgb 12 and wbc 11.4- overall stable.  She was diagnosed with coronavirus infection and is being evaluated for Paxlovid use given her underlying type 2 diabetes.  She has some cough noted but no fever.  Staff reports no other new issues or concerns today.      Review of Systems   Constitutional:  Positive for fatigue. Negative for chills and fever.   HENT:  Positive for hearing loss. Negative for congestion and sore throat.    Eyes:  Negative for visual disturbance.   Respiratory:  Positive for cough and shortness of breath (At baseline). Negative for wheezing.    Cardiovascular:  Positive for leg swelling. Negative for chest pain and palpitations.   Gastrointestinal:  Negative for abdominal pain, constipation, diarrhea, nausea and vomiting.   Genitourinary:  Negative for dysuria, frequency, hematuria and urgency.   Musculoskeletal:  Positive for gait problem.   Skin:  Negative for rash.   Neurological:  Negative for dizziness, syncope and headaches.   Psychiatric/Behavioral:  Positive for confusion. Negative for dysphoric mood. The patient is not nervous/anxious.        Objective  There were no vitals taken for this visit.    Physical Exam  Vitals reviewed: 132/72 temp 97.6 pulse 68 respirations 18 weight pending for August was 211 in July.   Constitutional:       General: She is not in acute distress.     Appearance: She is not ill-appearing.   HENT:      Head: Normocephalic.      Nose: No congestion.      Mouth/Throat:      Mouth: Mucous membranes are dry.      Pharynx: Oropharynx is clear.   Eyes:      General: No scleral icterus.     Conjunctiva/sclera: Conjunctivae normal.   Cardiovascular:       Rate and Rhythm: Normal rate and regular rhythm.      Heart sounds:      No gallop.   Pulmonary:      Effort: Pulmonary effort is normal.      Breath sounds: No wheezing or rhonchi.   Abdominal:      General: Bowel sounds are normal.      Tenderness: There is no abdominal tenderness. There is no rebound.   Musculoskeletal:      Cervical back: Neck supple.      Right lower leg: Edema present.      Left lower leg: Edema present.   Lymphadenopathy:      Cervical: No cervical adenopathy.   Skin:     General: Skin is warm and dry.   Neurological:      Mental Status: Mental status is at baseline.      Motor: Weakness present.      Gait: Gait abnormal.         Assessment/Plan  Problem List Items Addressed This Visit             ICD-10-CM    Hypertension I10    Paranoid schizophrenia (Multi) F20.0     Other Visit Diagnoses         Codes    COVID-19    -  Primary U07.1    Stage 3b chronic kidney disease (Multi)     N18.32    Type 2 diabetes mellitus with hyperglycemia, with long-term current use of insulin (Multi)     E11.65, Z79.4            Follow-up with psychiatry regarding any medication adjustments needed for paranoid schizophrenia,    Covid-paxlovid    Monitor renal function, last done July- recheck october   Goals    None           Electronically Signed By: Mari Peters MD   8/5/24  1:49 PM

## 2024-08-05 ASSESSMENT — ENCOUNTER SYMPTOMS
SORE THROAT: 0
DIARRHEA: 0
HEADACHES: 0
DYSURIA: 0
CONSTIPATION: 0
DYSPHORIC MOOD: 0
CHILLS: 0
HEMATURIA: 0
FEVER: 0
VOMITING: 0
FREQUENCY: 0
DIZZINESS: 0
SHORTNESS OF BREATH: 1
NERVOUS/ANXIOUS: 0
WHEEZING: 0
ABDOMINAL PAIN: 0
COUGH: 1
CONFUSION: 1
PALPITATIONS: 0
FATIGUE: 1
NAUSEA: 0

## 2024-08-05 NOTE — PROGRESS NOTES
Subjective   Patient ID: Brianna Argueta is a 74 y.o. female who is long term resident being seen and evaluated for multiple medical problems.    HPI patient being seen for monthly visit.  She did have laboratory studies end of July showing gfr 54 with hgb 12 and wbc 11.4- overall stable.  She was diagnosed with coronavirus infection and is being evaluated for Paxlovid use given her underlying type 2 diabetes.  She has some cough noted but no fever.  Staff reports no other new issues or concerns today.      Review of Systems   Constitutional:  Positive for fatigue. Negative for chills and fever.   HENT:  Positive for hearing loss. Negative for congestion and sore throat.    Eyes:  Negative for visual disturbance.   Respiratory:  Positive for cough and shortness of breath (At baseline). Negative for wheezing.    Cardiovascular:  Positive for leg swelling. Negative for chest pain and palpitations.   Gastrointestinal:  Negative for abdominal pain, constipation, diarrhea, nausea and vomiting.   Genitourinary:  Negative for dysuria, frequency, hematuria and urgency.   Musculoskeletal:  Positive for gait problem.   Skin:  Negative for rash.   Neurological:  Negative for dizziness, syncope and headaches.   Psychiatric/Behavioral:  Positive for confusion. Negative for dysphoric mood. The patient is not nervous/anxious.        Objective   There were no vitals taken for this visit.    Physical Exam  Vitals reviewed: 132/72 temp 97.6 pulse 68 respirations 18 weight pending for August was 211 in July.   Constitutional:       General: She is not in acute distress.     Appearance: She is not ill-appearing.   HENT:      Head: Normocephalic.      Nose: No congestion.      Mouth/Throat:      Mouth: Mucous membranes are dry.      Pharynx: Oropharynx is clear.   Eyes:      General: No scleral icterus.     Conjunctiva/sclera: Conjunctivae normal.   Cardiovascular:      Rate and Rhythm: Normal rate and regular rhythm.      Heart sounds:       No gallop.   Pulmonary:      Effort: Pulmonary effort is normal.      Breath sounds: No wheezing or rhonchi.   Abdominal:      General: Bowel sounds are normal.      Tenderness: There is no abdominal tenderness. There is no rebound.   Musculoskeletal:      Cervical back: Neck supple.      Right lower leg: Edema present.      Left lower leg: Edema present.   Lymphadenopathy:      Cervical: No cervical adenopathy.   Skin:     General: Skin is warm and dry.   Neurological:      Mental Status: Mental status is at baseline.      Motor: Weakness present.      Gait: Gait abnormal.         Assessment/Plan   Problem List Items Addressed This Visit             ICD-10-CM    Hypertension I10    Paranoid schizophrenia (Multi) F20.0     Other Visit Diagnoses         Codes    COVID-19    -  Primary U07.1    Stage 3b chronic kidney disease (Multi)     N18.32    Type 2 diabetes mellitus with hyperglycemia, with long-term current use of insulin (Multi)     E11.65, Z79.4            Follow-up with psychiatry regarding any medication adjustments needed for paranoid schizophrenia,    Covid-paxlovid    Monitor renal function, last done July- recheck october   Goals    None

## 2024-09-03 ENCOUNTER — NURSING HOME VISIT (OUTPATIENT)
Dept: POST ACUTE CARE | Facility: EXTERNAL LOCATION | Age: 74
End: 2024-09-03
Payer: COMMERCIAL

## 2024-09-03 DIAGNOSIS — Z79.4 TYPE 2 DIABETES MELLITUS WITH STAGE 3B CHRONIC KIDNEY DISEASE, WITH LONG-TERM CURRENT USE OF INSULIN (MULTI): ICD-10-CM

## 2024-09-03 DIAGNOSIS — N18.31 ANEMIA DUE TO STAGE 3A CHRONIC KIDNEY DISEASE (MULTI): ICD-10-CM

## 2024-09-03 DIAGNOSIS — D63.1 ANEMIA DUE TO STAGE 3A CHRONIC KIDNEY DISEASE (MULTI): ICD-10-CM

## 2024-09-03 DIAGNOSIS — N18.32 TYPE 2 DIABETES MELLITUS WITH STAGE 3B CHRONIC KIDNEY DISEASE, WITH LONG-TERM CURRENT USE OF INSULIN (MULTI): ICD-10-CM

## 2024-09-03 DIAGNOSIS — N18.31 STAGE 3A CHRONIC KIDNEY DISEASE (MULTI): ICD-10-CM

## 2024-09-03 DIAGNOSIS — E78.2 MIXED HYPERLIPIDEMIA: ICD-10-CM

## 2024-09-03 DIAGNOSIS — E11.22 TYPE 2 DIABETES MELLITUS WITH STAGE 3B CHRONIC KIDNEY DISEASE, WITH LONG-TERM CURRENT USE OF INSULIN (MULTI): ICD-10-CM

## 2024-09-03 DIAGNOSIS — I10 PRIMARY HYPERTENSION: Primary | ICD-10-CM

## 2024-09-03 PROCEDURE — 99309 SBSQ NF CARE MODERATE MDM 30: CPT | Performed by: FAMILY MEDICINE

## 2024-09-03 NOTE — LETTER
Patient: Brianna Argueta  : 1950    Encounter Date: 2024    Subjective  Patient ID: Brianna Argueta is a 74 y.o. female who is long term resident being seen and evaluated for multiple medical problems.    HPI patient being seen for monthly visit.  She did have laboratory studies end of July showing gfr 54 with hgb 12 and wbc 11.4- overall stable.  She was diagnosed with coronavirus infection and is now recovered. A1c end of July 7.7. Staff reports no other new issues or concerns today.      Review of Systems   Constitutional:  Positive for fatigue. Negative for chills and fever.   HENT:  Positive for hearing loss. Negative for congestion and sore throat.    Eyes:  Negative for visual disturbance.   Respiratory:  Positive for cough and shortness of breath (At baseline). Negative for wheezing.    Cardiovascular:  Positive for leg swelling. Negative for chest pain and palpitations.   Gastrointestinal:  Negative for abdominal pain, constipation, diarrhea, nausea and vomiting.   Genitourinary:  Negative for dysuria, frequency, hematuria and urgency.   Musculoskeletal:  Positive for gait problem.   Skin:  Negative for rash.   Neurological:  Negative for dizziness, syncope and headaches.   Psychiatric/Behavioral:  Positive for confusion. Negative for dysphoric mood. The patient is not nervous/anxious.        Objective  There were no vitals taken for this visit.    Physical Exam  Vitals reviewed: 124/68 97 68 18 wt 212.   Constitutional:       General: She is not in acute distress.     Appearance: She is not ill-appearing.   HENT:      Head: Normocephalic.      Nose: No congestion.      Mouth/Throat:      Mouth: Mucous membranes are dry.      Pharynx: Oropharynx is clear.   Eyes:      General: No scleral icterus.     Conjunctiva/sclera: Conjunctivae normal.   Cardiovascular:      Rate and Rhythm: Normal rate and regular rhythm.      Heart sounds:      No gallop.   Pulmonary:      Effort: Pulmonary effort is  normal.      Breath sounds: No wheezing or rhonchi.   Abdominal:      General: Bowel sounds are normal.      Tenderness: There is no abdominal tenderness. There is no rebound.   Musculoskeletal:      Cervical back: Neck supple.      Right lower leg: Edema present.      Left lower leg: Edema present.   Lymphadenopathy:      Cervical: No cervical adenopathy.   Skin:     General: Skin is warm and dry.   Neurological:      Mental Status: Mental status is at baseline.      Motor: Weakness present.      Gait: Gait abnormal.         Assessment/Plan  Problem List Items Addressed This Visit             ICD-10-CM    Hyperlipidemia E78.5    Hypertension - Primary I10     Other Visit Diagnoses         Codes    Type 2 diabetes mellitus with stage 3b chronic kidney disease, with long-term current use of insulin (Multi)     E11.22, N18.32, Z79.4    Anemia due to stage 3a chronic kidney disease (Multi)     N18.31, D63.1    Stage 3a chronic kidney disease (Multi)     N18.31              Follow-up with psychiatry regarding any medication adjustments needed for paranoid schizophrenia,  Lab rechecks due oct-will order cbc cmp lipids A1c     Goals    None           Electronically Signed By: Mari Peters MD   9/4/24  4:39 PM

## 2024-09-04 ASSESSMENT — ENCOUNTER SYMPTOMS
HEMATURIA: 0
FATIGUE: 1
DYSPHORIC MOOD: 0
COUGH: 1
NAUSEA: 0
PALPITATIONS: 0
HEADACHES: 0
SORE THROAT: 0
SHORTNESS OF BREATH: 1
CHILLS: 0
DIZZINESS: 0
FEVER: 0
WHEEZING: 0
FREQUENCY: 0
ABDOMINAL PAIN: 0
CONSTIPATION: 0
VOMITING: 0
DYSURIA: 0
NERVOUS/ANXIOUS: 0
DIARRHEA: 0
CONFUSION: 1

## 2024-09-04 NOTE — PROGRESS NOTES
Subjective   Patient ID: Brianna Argueta is a 74 y.o. female who is long term resident being seen and evaluated for multiple medical problems.    HPI patient being seen for monthly visit.  She did have laboratory studies end of July showing gfr 54 with hgb 12 and wbc 11.4- overall stable.  She was diagnosed with coronavirus infection and is now recovered. A1c end of July 7.7. Staff reports no other new issues or concerns today.      Review of Systems   Constitutional:  Positive for fatigue. Negative for chills and fever.   HENT:  Positive for hearing loss. Negative for congestion and sore throat.    Eyes:  Negative for visual disturbance.   Respiratory:  Positive for cough and shortness of breath (At baseline). Negative for wheezing.    Cardiovascular:  Positive for leg swelling. Negative for chest pain and palpitations.   Gastrointestinal:  Negative for abdominal pain, constipation, diarrhea, nausea and vomiting.   Genitourinary:  Negative for dysuria, frequency, hematuria and urgency.   Musculoskeletal:  Positive for gait problem.   Skin:  Negative for rash.   Neurological:  Negative for dizziness, syncope and headaches.   Psychiatric/Behavioral:  Positive for confusion. Negative for dysphoric mood. The patient is not nervous/anxious.        Objective   There were no vitals taken for this visit.    Physical Exam  Vitals reviewed: 124/68 97 68 18 wt 212.   Constitutional:       General: She is not in acute distress.     Appearance: She is not ill-appearing.   HENT:      Head: Normocephalic.      Nose: No congestion.      Mouth/Throat:      Mouth: Mucous membranes are dry.      Pharynx: Oropharynx is clear.   Eyes:      General: No scleral icterus.     Conjunctiva/sclera: Conjunctivae normal.   Cardiovascular:      Rate and Rhythm: Normal rate and regular rhythm.      Heart sounds:      No gallop.   Pulmonary:      Effort: Pulmonary effort is normal.      Breath sounds: No wheezing or rhonchi.   Abdominal:       General: Bowel sounds are normal.      Tenderness: There is no abdominal tenderness. There is no rebound.   Musculoskeletal:      Cervical back: Neck supple.      Right lower leg: Edema present.      Left lower leg: Edema present.   Lymphadenopathy:      Cervical: No cervical adenopathy.   Skin:     General: Skin is warm and dry.   Neurological:      Mental Status: Mental status is at baseline.      Motor: Weakness present.      Gait: Gait abnormal.         Assessment/Plan   Problem List Items Addressed This Visit             ICD-10-CM    Hyperlipidemia E78.5    Hypertension - Primary I10     Other Visit Diagnoses         Codes    Type 2 diabetes mellitus with stage 3b chronic kidney disease, with long-term current use of insulin (Multi)     E11.22, N18.32, Z79.4    Anemia due to stage 3a chronic kidney disease (Multi)     N18.31, D63.1    Stage 3a chronic kidney disease (Multi)     N18.31              Follow-up with psychiatry regarding any medication adjustments needed for paranoid schizophrenia,  Lab rechecks due oct-will order cbc cmp lipids A1c     Goals    None

## 2024-09-25 ENCOUNTER — NURSING HOME VISIT (OUTPATIENT)
Dept: POST ACUTE CARE | Facility: EXTERNAL LOCATION | Age: 74
End: 2024-09-25
Payer: COMMERCIAL

## 2024-09-25 VITALS
DIASTOLIC BLOOD PRESSURE: 68 MMHG | BODY MASS INDEX: 41.19 KG/M2 | TEMPERATURE: 97.8 F | HEART RATE: 68 BPM | OXYGEN SATURATION: 96 % | SYSTOLIC BLOOD PRESSURE: 124 MMHG | RESPIRATION RATE: 17 BRPM | WEIGHT: 232.5 LBS

## 2024-09-25 DIAGNOSIS — E11.22 TYPE 2 DIABETES MELLITUS WITH STAGE 3B CHRONIC KIDNEY DISEASE, WITH LONG-TERM CURRENT USE OF INSULIN (MULTI): ICD-10-CM

## 2024-09-25 DIAGNOSIS — I89.0 LYMPHEDEMA: ICD-10-CM

## 2024-09-25 DIAGNOSIS — Z79.4 TYPE 2 DIABETES MELLITUS WITH STAGE 3B CHRONIC KIDNEY DISEASE, WITH LONG-TERM CURRENT USE OF INSULIN (MULTI): ICD-10-CM

## 2024-09-25 DIAGNOSIS — K59.1 FUNCTIONAL DIARRHEA: Primary | ICD-10-CM

## 2024-09-25 DIAGNOSIS — N18.32 TYPE 2 DIABETES MELLITUS WITH STAGE 3B CHRONIC KIDNEY DISEASE, WITH LONG-TERM CURRENT USE OF INSULIN (MULTI): ICD-10-CM

## 2024-09-25 PROCEDURE — 99309 SBSQ NF CARE MODERATE MDM 30: CPT | Performed by: NURSE PRACTITIONER

## 2024-09-25 ASSESSMENT — ENCOUNTER SYMPTOMS: DIARRHEA: 1

## 2024-09-25 NOTE — LETTER
Patient: Brianna Argueta  : 1950    Encounter Date: 2024    Subjective  Patient ID: Brianna Argueta is a 74 y.o. female who presents for Diarrhea.    Asked to see patient for diarrhea. Patient does not not recall having any loose stool. Staff states that it has been present for the last few days. She takes nothing for constipation. She denies feeling like she is sick. She does take metformin for management of her diabetes. Her last HGA1C was 7.7 checked in 2024. She denies feeling like her blood sugar is low. She remains on lasix daily for treatment of edema. She has gained weight this month         Review of Systems   Cardiovascular:  Negative for leg swelling.   Gastrointestinal:  Positive for diarrhea.       Objective  /68   Pulse 68   Temp 36.6 °C (97.8 °F)   Resp 17   Wt 105 kg (232 lb 8 oz)   SpO2 96%   BMI 41.19 kg/m²     Physical Exam  Constitutional:       Appearance: She is obese.   HENT:      Mouth/Throat:      Mouth: Mucous membranes are moist.   Eyes:      Conjunctiva/sclera: Conjunctivae normal.   Pulmonary:      Effort: Pulmonary effort is normal.   Abdominal:      General: Bowel sounds are normal. There is no distension.      Palpations: Abdomen is soft.      Tenderness: There is no abdominal tenderness.   Skin:     General: Skin is warm and dry.   Neurological:      Mental Status: She is alert. Mental status is at baseline.   Psychiatric:         Mood and Affect: Affect is flat.         Assessment/Plan  Diagnoses and all orders for this visit:  Functional diarrhea  Comments:  will monitor with change to Metformin  Type 2 diabetes mellitus with stage 3b chronic kidney disease, with long-term current use of insulin (Multi)  Comments:  change metformin to ER form, HGA1C 2024  Lymphedema  Comments:  chronic/stable: cont with lasix, labs           Electronically Signed By: BELINDA Banerjee-CNP   24  4:51 PM

## 2024-09-25 NOTE — PROGRESS NOTES
Subjective   Patient ID: Brianna Argueta is a 74 y.o. female who presents for Diarrhea.    Asked to see patient for diarrhea. Patient does not not recall having any loose stool. Staff states that it has been present for the last few days. She takes nothing for constipation. She denies feeling like she is sick. She does take metformin for management of her diabetes. Her last HGA1C was 7.7 checked in 7/2024. She denies feeling like her blood sugar is low. She remains on lasix daily for treatment of edema. She has gained weight this month         Review of Systems   Cardiovascular:  Negative for leg swelling.   Gastrointestinal:  Positive for diarrhea.       Objective   /68   Pulse 68   Temp 36.6 °C (97.8 °F)   Resp 17   Wt 105 kg (232 lb 8 oz)   SpO2 96%   BMI 41.19 kg/m²     Physical Exam  Constitutional:       Appearance: She is obese.   HENT:      Mouth/Throat:      Mouth: Mucous membranes are moist.   Eyes:      Conjunctiva/sclera: Conjunctivae normal.   Pulmonary:      Effort: Pulmonary effort is normal.   Abdominal:      General: Bowel sounds are normal. There is no distension.      Palpations: Abdomen is soft.      Tenderness: There is no abdominal tenderness.   Skin:     General: Skin is warm and dry.   Neurological:      Mental Status: She is alert. Mental status is at baseline.   Psychiatric:         Mood and Affect: Affect is flat.         Assessment/Plan   Diagnoses and all orders for this visit:  Functional diarrhea  Comments:  will monitor with change to Metformin  Type 2 diabetes mellitus with stage 3b chronic kidney disease, with long-term current use of insulin (Multi)  Comments:  change metformin to ER form, HGA1C 11/2024  Lymphedema  Comments:  chronic/stable: cont with lasix, labs

## 2024-10-07 ENCOUNTER — NURSING HOME VISIT (OUTPATIENT)
Dept: POST ACUTE CARE | Facility: EXTERNAL LOCATION | Age: 74
End: 2024-10-07
Payer: COMMERCIAL

## 2024-10-07 DIAGNOSIS — F31.75 BIPOLAR DISORDER, IN PARTIAL REMISSION, MOST RECENT EPISODE DEPRESSED (MULTI): ICD-10-CM

## 2024-10-07 DIAGNOSIS — Z79.4 TYPE 2 DIABETES MELLITUS WITH STAGE 3B CHRONIC KIDNEY DISEASE, WITH LONG-TERM CURRENT USE OF INSULIN (MULTI): ICD-10-CM

## 2024-10-07 DIAGNOSIS — F20.0 PARANOID SCHIZOPHRENIA (MULTI): ICD-10-CM

## 2024-10-07 DIAGNOSIS — N18.31 STAGE 3A CHRONIC KIDNEY DISEASE (MULTI): Primary | ICD-10-CM

## 2024-10-07 DIAGNOSIS — D63.1 ANEMIA DUE TO STAGE 3A CHRONIC KIDNEY DISEASE (MULTI): ICD-10-CM

## 2024-10-07 DIAGNOSIS — I10 PRIMARY HYPERTENSION: ICD-10-CM

## 2024-10-07 DIAGNOSIS — N18.31 ANEMIA DUE TO STAGE 3A CHRONIC KIDNEY DISEASE (MULTI): ICD-10-CM

## 2024-10-07 DIAGNOSIS — N18.32 TYPE 2 DIABETES MELLITUS WITH STAGE 3B CHRONIC KIDNEY DISEASE, WITH LONG-TERM CURRENT USE OF INSULIN (MULTI): ICD-10-CM

## 2024-10-07 DIAGNOSIS — E11.22 TYPE 2 DIABETES MELLITUS WITH STAGE 3B CHRONIC KIDNEY DISEASE, WITH LONG-TERM CURRENT USE OF INSULIN (MULTI): ICD-10-CM

## 2024-10-07 PROCEDURE — 99309 SBSQ NF CARE MODERATE MDM 30: CPT | Performed by: FAMILY MEDICINE

## 2024-10-07 ASSESSMENT — ENCOUNTER SYMPTOMS
DIZZINESS: 0
VOMITING: 0
NAUSEA: 0
ABDOMINAL PAIN: 0
DYSURIA: 0
FATIGUE: 1
DIARRHEA: 0
HEMATURIA: 0
SHORTNESS OF BREATH: 1
PALPITATIONS: 0
HEADACHES: 0
FREQUENCY: 0
NERVOUS/ANXIOUS: 0
CONSTIPATION: 0
SORE THROAT: 0
CHILLS: 0
CONFUSION: 1
FEVER: 0
WHEEZING: 0
COUGH: 1
DYSPHORIC MOOD: 0

## 2024-10-07 NOTE — LETTER
Patient: Brianna Argueta  : 1950    Encounter Date: 10/07/2024    Subjective  Patient ID: Brianna Argueta is a 74 y.o. female who is long term resident being seen and evaluated for multiple medical problems.    HPI patient being seen for monthly visit.  She did have laboratory studies end of July showing gfr 54 with hgb 12 and wbc 11.4- overall stable.  She was diagnosed with coronavirus infection and is now recovered. A1c end of July 7.7. Staff reports no other new issues or concerns today. Labs ordered for .     Review of Systems   Constitutional:  Positive for fatigue. Negative for chills and fever.   HENT:  Positive for hearing loss. Negative for congestion and sore throat.    Eyes:  Negative for visual disturbance.   Respiratory:  Positive for cough and shortness of breath (At baseline). Negative for wheezing.    Cardiovascular:  Positive for leg swelling. Negative for chest pain and palpitations.   Gastrointestinal:  Negative for abdominal pain, constipation, diarrhea, nausea and vomiting.   Genitourinary:  Negative for dysuria, frequency, hematuria and urgency.   Musculoskeletal:  Positive for gait problem.   Skin:  Negative for rash.   Neurological:  Negative for dizziness, syncope and headaches.   Psychiatric/Behavioral:  Positive for confusion. Negative for dysphoric mood. The patient is not nervous/anxious.        Objective  There were no vitals taken for this visit.    Physical Exam  Vitals reviewed: 126/68 97.8 70 18 wt 210-down 2.   Constitutional:       General: She is not in acute distress.     Appearance: She is not ill-appearing.   HENT:      Head: Normocephalic.      Nose: No congestion.      Mouth/Throat:      Mouth: Mucous membranes are dry.      Pharynx: Oropharynx is clear.   Eyes:      General: No scleral icterus.     Conjunctiva/sclera: Conjunctivae normal.   Cardiovascular:      Rate and Rhythm: Normal rate and regular rhythm.      Heart sounds:      No gallop.   Pulmonary:       Effort: Pulmonary effort is normal.      Breath sounds: No wheezing or rhonchi.   Abdominal:      General: Bowel sounds are normal.      Tenderness: There is no abdominal tenderness. There is no rebound.   Musculoskeletal:      Cervical back: Neck supple.      Right lower leg: Edema present.      Left lower leg: Edema present.   Lymphadenopathy:      Cervical: No cervical adenopathy.   Skin:     General: Skin is warm and dry.   Neurological:      Mental Status: Mental status is at baseline.      Motor: Weakness present.      Gait: Gait abnormal.         Assessment/Plan  Problem List Items Addressed This Visit             ICD-10-CM    Bipolar mood disorder (Multi) F31.9    Hypertension I10    Paranoid schizophrenia (Multi) F20.0     Other Visit Diagnoses         Codes    Stage 3a chronic kidney disease (Multi)    -  Primary N18.31    Type 2 diabetes mellitus with stage 3b chronic kidney disease, with long-term current use of insulin (Multi)     E11.22, N18.32, Z79.4    Anemia due to stage 3a chronic kidney disease (Multi)     N18.31, D63.1                Follow-up with psychiatry regarding any medication adjustments needed for paranoid schizophrenia,  Lab rechecks due-ordered   Goals    None           Electronically Signed By: Mari Peters MD   10/7/24 11:31 PM

## 2024-10-08 NOTE — PROGRESS NOTES
Subjective   Patient ID: Brianna Argueta is a 74 y.o. female who is long term resident being seen and evaluated for multiple medical problems.    HPI patient being seen for monthly visit.  She did have laboratory studies end of July showing gfr 54 with hgb 12 and wbc 11.4- overall stable.  She was diagnosed with coronavirus infection and is now recovered. A1c end of July 7.7. Staff reports no other new issues or concerns today. Labs ordered for 11/4.     Review of Systems   Constitutional:  Positive for fatigue. Negative for chills and fever.   HENT:  Positive for hearing loss. Negative for congestion and sore throat.    Eyes:  Negative for visual disturbance.   Respiratory:  Positive for cough and shortness of breath (At baseline). Negative for wheezing.    Cardiovascular:  Positive for leg swelling. Negative for chest pain and palpitations.   Gastrointestinal:  Negative for abdominal pain, constipation, diarrhea, nausea and vomiting.   Genitourinary:  Negative for dysuria, frequency, hematuria and urgency.   Musculoskeletal:  Positive for gait problem.   Skin:  Negative for rash.   Neurological:  Negative for dizziness, syncope and headaches.   Psychiatric/Behavioral:  Positive for confusion. Negative for dysphoric mood. The patient is not nervous/anxious.        Objective   There were no vitals taken for this visit.    Physical Exam  Vitals reviewed: 126/68 97.8 70 18 wt 210-down 2.   Constitutional:       General: She is not in acute distress.     Appearance: She is not ill-appearing.   HENT:      Head: Normocephalic.      Nose: No congestion.      Mouth/Throat:      Mouth: Mucous membranes are dry.      Pharynx: Oropharynx is clear.   Eyes:      General: No scleral icterus.     Conjunctiva/sclera: Conjunctivae normal.   Cardiovascular:      Rate and Rhythm: Normal rate and regular rhythm.      Heart sounds:      No gallop.   Pulmonary:      Effort: Pulmonary effort is normal.      Breath sounds: No wheezing or  rhonchi.   Abdominal:      General: Bowel sounds are normal.      Tenderness: There is no abdominal tenderness. There is no rebound.   Musculoskeletal:      Cervical back: Neck supple.      Right lower leg: Edema present.      Left lower leg: Edema present.   Lymphadenopathy:      Cervical: No cervical adenopathy.   Skin:     General: Skin is warm and dry.   Neurological:      Mental Status: Mental status is at baseline.      Motor: Weakness present.      Gait: Gait abnormal.         Assessment/Plan   Problem List Items Addressed This Visit             ICD-10-CM    Bipolar mood disorder (Multi) F31.9    Hypertension I10    Paranoid schizophrenia (Multi) F20.0     Other Visit Diagnoses         Codes    Stage 3a chronic kidney disease (Multi)    -  Primary N18.31    Type 2 diabetes mellitus with stage 3b chronic kidney disease, with long-term current use of insulin (Multi)     E11.22, N18.32, Z79.4    Anemia due to stage 3a chronic kidney disease (Multi)     N18.31, D63.1                Follow-up with psychiatry regarding any medication adjustments needed for paranoid schizophrenia,  Lab rechecks due-ordered   Goals    None

## 2024-10-17 ENCOUNTER — NURSING HOME VISIT (OUTPATIENT)
Dept: POST ACUTE CARE | Facility: EXTERNAL LOCATION | Age: 74
End: 2024-10-17
Payer: COMMERCIAL

## 2024-10-17 DIAGNOSIS — K59.1 FUNCTIONAL DIARRHEA: ICD-10-CM

## 2024-10-17 DIAGNOSIS — F20.0 PARANOID SCHIZOPHRENIA (MULTI): ICD-10-CM

## 2024-10-17 DIAGNOSIS — E11.65 TYPE 2 DIABETES MELLITUS WITH HYPERGLYCEMIA, WITH LONG-TERM CURRENT USE OF INSULIN: Primary | ICD-10-CM

## 2024-10-17 DIAGNOSIS — Z79.4 TYPE 2 DIABETES MELLITUS WITH HYPERGLYCEMIA, WITH LONG-TERM CURRENT USE OF INSULIN: Primary | ICD-10-CM

## 2024-10-17 PROCEDURE — 99309 SBSQ NF CARE MODERATE MDM 30: CPT | Performed by: NURSE PRACTITIONER

## 2024-10-17 NOTE — LETTER
Patient: Brianna Argueta  : 1950    Encounter Date: 10/17/2024    Subjective  Patient ID: Brianna Argueta is a 74 y.o. female who presents for Diabetes.    Following up with patient who changed from metformin to metformin ER due to stool incontinence and diarrhea. It is documented that she has been having formed to soft stools daily. She denies any abdominal symptoms. Reviewed blood sugars which have been slightly higher since the switch. She remains on long term insulin, amaryl, jardiance and humalog before meals. Last HGA1c WAS 7.7 IN July of this year.    Patient was diagnosed with schizophrenia sometime prior to  per her son. She lived at the [facility] home since  and was managed by psych services there with a few hospitalization for medication adjustment and compliance. She was diagnosed by Dr Magaña.         Review of Systems   All other systems reviewed and are negative.      Objective  /68   Pulse 70   Temp 36.6 °C (97.8 °F)   Resp 18   Wt 95.4 kg (210 lb 6.4 oz)   SpO2 97%   BMI 37.27 kg/m²     Physical Exam  Constitutional:       General: She is not in acute distress.     Appearance: She is obese. She is not ill-appearing.   HENT:      Mouth/Throat:      Mouth: Mucous membranes are moist.   Eyes:      Conjunctiva/sclera: Conjunctivae normal.   Cardiovascular:      Rate and Rhythm: Normal rate and regular rhythm.   Pulmonary:      Effort: Pulmonary effort is normal.      Breath sounds: Examination of the right-upper field reveals decreased breath sounds. Examination of the left-upper field reveals decreased breath sounds. Decreased breath sounds present.   Abdominal:      General: Bowel sounds are normal. There is no distension.      Tenderness: There is no abdominal tenderness.   Musculoskeletal:      Comments: Steady gait   Skin:     General: Skin is warm and dry.   Neurological:      Mental Status: She is alert.   Psychiatric:         Mood and Affect: Affect is flat.          Assessment/Plan  Diagnoses and all orders for this visit:  Type 2 diabetes mellitus with hyperglycemia, with long-term current use of insulin  Comments:  chronic/stable: increase metformin ER to 1000mg BID, cont with current regimen, HGA1C q 3 months  Functional diarrhea  Comments:  resolved  Paranoid schizophrenia (Multi)  Comments:  chronic/stable: cont with invega, psych input           Electronically Signed By: MEAGAN Banerjee   10/24/24  7:19 AM

## 2024-10-24 VITALS
BODY MASS INDEX: 37.27 KG/M2 | OXYGEN SATURATION: 97 % | WEIGHT: 210.4 LBS | SYSTOLIC BLOOD PRESSURE: 126 MMHG | DIASTOLIC BLOOD PRESSURE: 68 MMHG | HEART RATE: 70 BPM | TEMPERATURE: 97.8 F | RESPIRATION RATE: 18 BRPM

## 2024-10-24 NOTE — PROGRESS NOTES
Subjective   Patient ID: Brianna Argueta is a 74 y.o. female who presents for Diabetes.    Following up with patient who changed from metformin to metformin ER due to stool incontinence and diarrhea. It is documented that she has been having formed to soft stools daily. She denies any abdominal symptoms. Reviewed blood sugars which have been slightly higher since the switch. She remains on long term insulin, amaryl, jardiance and humalog before meals. Last HGA1c WAS 7.7 IN July of this year.    Patient was diagnosed with schizophrenia sometime prior to 2001 per her son. She lived at the [facility] home since 2009 and was managed by psych services there with a few hospitalization for medication adjustment and compliance. She was diagnosed by Dr Magaña.         Review of Systems   All other systems reviewed and are negative.      Objective   /68   Pulse 70   Temp 36.6 °C (97.8 °F)   Resp 18   Wt 95.4 kg (210 lb 6.4 oz)   SpO2 97%   BMI 37.27 kg/m²     Physical Exam  Constitutional:       General: She is not in acute distress.     Appearance: She is obese. She is not ill-appearing.   HENT:      Mouth/Throat:      Mouth: Mucous membranes are moist.   Eyes:      Conjunctiva/sclera: Conjunctivae normal.   Cardiovascular:      Rate and Rhythm: Normal rate and regular rhythm.   Pulmonary:      Effort: Pulmonary effort is normal.      Breath sounds: Examination of the right-upper field reveals decreased breath sounds. Examination of the left-upper field reveals decreased breath sounds. Decreased breath sounds present.   Abdominal:      General: Bowel sounds are normal. There is no distension.      Tenderness: There is no abdominal tenderness.   Musculoskeletal:      Comments: Steady gait   Skin:     General: Skin is warm and dry.   Neurological:      Mental Status: She is alert.   Psychiatric:         Mood and Affect: Affect is flat.         Assessment/Plan   Diagnoses and all orders for this visit:  Type 2 diabetes  mellitus with hyperglycemia, with long-term current use of insulin  Comments:  chronic/stable: increase metformin ER to 1000mg BID, cont with current regimen, HGA1C q 3 months  Functional diarrhea  Comments:  resolved  Paranoid schizophrenia (Multi)  Comments:  chronic/stable: cont with invega, psych input

## 2024-11-04 ENCOUNTER — NURSING HOME VISIT (OUTPATIENT)
Dept: POST ACUTE CARE | Facility: EXTERNAL LOCATION | Age: 74
End: 2024-11-04
Payer: COMMERCIAL

## 2024-11-04 DIAGNOSIS — F20.0 PARANOID SCHIZOPHRENIA (MULTI): ICD-10-CM

## 2024-11-04 DIAGNOSIS — N18.32 TYPE 2 DIABETES MELLITUS WITH STAGE 3B CHRONIC KIDNEY DISEASE, WITH LONG-TERM CURRENT USE OF INSULIN (MULTI): Primary | ICD-10-CM

## 2024-11-04 DIAGNOSIS — E78.2 MIXED HYPERLIPIDEMIA: ICD-10-CM

## 2024-11-04 DIAGNOSIS — N18.32 STAGE 3B CHRONIC KIDNEY DISEASE (MULTI): ICD-10-CM

## 2024-11-04 DIAGNOSIS — Z79.4 TYPE 2 DIABETES MELLITUS WITH STAGE 3B CHRONIC KIDNEY DISEASE, WITH LONG-TERM CURRENT USE OF INSULIN (MULTI): Primary | ICD-10-CM

## 2024-11-04 DIAGNOSIS — I10 PRIMARY HYPERTENSION: ICD-10-CM

## 2024-11-04 DIAGNOSIS — E11.22 TYPE 2 DIABETES MELLITUS WITH STAGE 3B CHRONIC KIDNEY DISEASE, WITH LONG-TERM CURRENT USE OF INSULIN (MULTI): Primary | ICD-10-CM

## 2024-11-04 PROCEDURE — 99309 SBSQ NF CARE MODERATE MDM 30: CPT | Performed by: FAMILY MEDICINE

## 2024-11-04 NOTE — LETTER
Patient: Brianna Argueta  : 1950    Encounter Date: 2024    Subjective  Patient ID: Brianna Argueta is a 74 y.o. female who is long term resident being seen and evaluated for multiple medical problems.    HPI patient being seen for monthly visit.  She did have laboratory studies end of July showing gfr 54 with hgb 12 and wbc 11.4- overall stable.  She was diagnosed with coronavirus infection in September and recovered uneventfully.  Labs pending today including CBC CMP lipids and A1c.      Review of Systems   Constitutional:  Positive for fatigue. Negative for chills and fever.   HENT:  Positive for hearing loss. Negative for congestion and sore throat.    Eyes:  Negative for visual disturbance.   Respiratory:  Positive for cough and shortness of breath (At baseline). Negative for wheezing.    Cardiovascular:  Positive for leg swelling. Negative for chest pain and palpitations.   Gastrointestinal:  Negative for abdominal pain, constipation, diarrhea, nausea and vomiting.   Genitourinary:  Negative for dysuria, frequency, hematuria and urgency.   Musculoskeletal:  Positive for gait problem.   Skin:  Negative for rash.   Neurological:  Negative for dizziness, syncope and headaches.   Psychiatric/Behavioral:  Positive for confusion. Negative for dysphoric mood. The patient is not nervous/anxious.        Objective  There were no vitals taken for this visit.    Physical Exam  Vitals reviewed: 116/72 temp 97.9 pulse 66 weight 208 which is down 2 more pounds.   Constitutional:       General: She is not in acute distress.     Appearance: She is not ill-appearing.   HENT:      Head: Normocephalic.      Nose: No congestion.      Mouth/Throat:      Mouth: Mucous membranes are dry.      Pharynx: Oropharynx is clear.   Eyes:      General: No scleral icterus.     Conjunctiva/sclera: Conjunctivae normal.   Cardiovascular:      Rate and Rhythm: Normal rate and regular rhythm.      Heart sounds:      No gallop.    Pulmonary:      Effort: Pulmonary effort is normal.      Breath sounds: No wheezing or rhonchi.   Abdominal:      General: Bowel sounds are normal.      Tenderness: There is no abdominal tenderness. There is no rebound.   Musculoskeletal:      Cervical back: Neck supple.      Right lower leg: Edema present.      Left lower leg: Edema present.   Lymphadenopathy:      Cervical: No cervical adenopathy.   Skin:     General: Skin is warm and dry.   Neurological:      Mental Status: Mental status is at baseline.      Motor: Weakness present.      Gait: Gait abnormal.         Assessment/Plan  Problem List Items Addressed This Visit             ICD-10-CM    Hyperlipidemia E78.5    Hypertension I10    Paranoid schizophrenia (Multi) F20.0     Other Visit Diagnoses         Codes    Type 2 diabetes mellitus with stage 3b chronic kidney disease, with long-term current use of insulin (Multi)    -  Primary E11.22, N18.32, Z79.4    Stage 3b chronic kidney disease (Multi)     N18.32            CBC CMP lipids and A1c pending today.  With weight loss anticipate all parameters are improved however will await results and make medication adjustments accordingly.  Continue to follow-up with psychiatry for paranoid schizophrenia.   Goals    None           Electronically Signed By: Mari Peters MD   11/5/24  1:15 PM

## 2024-11-05 ASSESSMENT — ENCOUNTER SYMPTOMS
HEMATURIA: 0
COUGH: 1
PALPITATIONS: 0
CONSTIPATION: 0
FREQUENCY: 0
ABDOMINAL PAIN: 0
SORE THROAT: 0
HEADACHES: 0
DIARRHEA: 0
CONFUSION: 1
DYSURIA: 0
VOMITING: 0
SHORTNESS OF BREATH: 1
NERVOUS/ANXIOUS: 0
DIZZINESS: 0
FATIGUE: 1
NAUSEA: 0
FEVER: 0
WHEEZING: 0
CHILLS: 0
DYSPHORIC MOOD: 0

## 2024-11-05 NOTE — PROGRESS NOTES
Subjective   Patient ID: Brianna Argueta is a 74 y.o. female who is long term resident being seen and evaluated for multiple medical problems.    HPI patient being seen for monthly visit.  She did have laboratory studies end of July showing gfr 54 with hgb 12 and wbc 11.4- overall stable.  She was diagnosed with coronavirus infection in September and recovered uneventfully.  Labs pending today including CBC CMP lipids and A1c.      Review of Systems   Constitutional:  Positive for fatigue. Negative for chills and fever.   HENT:  Positive for hearing loss. Negative for congestion and sore throat.    Eyes:  Negative for visual disturbance.   Respiratory:  Positive for cough and shortness of breath (At baseline). Negative for wheezing.    Cardiovascular:  Positive for leg swelling. Negative for chest pain and palpitations.   Gastrointestinal:  Negative for abdominal pain, constipation, diarrhea, nausea and vomiting.   Genitourinary:  Negative for dysuria, frequency, hematuria and urgency.   Musculoskeletal:  Positive for gait problem.   Skin:  Negative for rash.   Neurological:  Negative for dizziness, syncope and headaches.   Psychiatric/Behavioral:  Positive for confusion. Negative for dysphoric mood. The patient is not nervous/anxious.        Objective   There were no vitals taken for this visit.    Physical Exam  Vitals reviewed: 116/72 temp 97.9 pulse 66 weight 208 which is down 2 more pounds.   Constitutional:       General: She is not in acute distress.     Appearance: She is not ill-appearing.   HENT:      Head: Normocephalic.      Nose: No congestion.      Mouth/Throat:      Mouth: Mucous membranes are dry.      Pharynx: Oropharynx is clear.   Eyes:      General: No scleral icterus.     Conjunctiva/sclera: Conjunctivae normal.   Cardiovascular:      Rate and Rhythm: Normal rate and regular rhythm.      Heart sounds:      No gallop.   Pulmonary:      Effort: Pulmonary effort is normal.      Breath sounds: No  wheezing or rhonchi.   Abdominal:      General: Bowel sounds are normal.      Tenderness: There is no abdominal tenderness. There is no rebound.   Musculoskeletal:      Cervical back: Neck supple.      Right lower leg: Edema present.      Left lower leg: Edema present.   Lymphadenopathy:      Cervical: No cervical adenopathy.   Skin:     General: Skin is warm and dry.   Neurological:      Mental Status: Mental status is at baseline.      Motor: Weakness present.      Gait: Gait abnormal.         Assessment/Plan   Problem List Items Addressed This Visit             ICD-10-CM    Hyperlipidemia E78.5    Hypertension I10    Paranoid schizophrenia (Multi) F20.0     Other Visit Diagnoses         Codes    Type 2 diabetes mellitus with stage 3b chronic kidney disease, with long-term current use of insulin (Multi)    -  Primary E11.22, N18.32, Z79.4    Stage 3b chronic kidney disease (Multi)     N18.32            CBC CMP lipids and A1c pending today.  With weight loss anticipate all parameters are improved however will await results and make medication adjustments accordingly.  Continue to follow-up with psychiatry for paranoid schizophrenia.   Goals    None

## 2024-11-06 ENCOUNTER — NURSING HOME VISIT (OUTPATIENT)
Dept: POST ACUTE CARE | Facility: EXTERNAL LOCATION | Age: 74
End: 2024-11-06
Payer: COMMERCIAL

## 2024-11-06 VITALS
WEIGHT: 208.1 LBS | OXYGEN SATURATION: 99 % | HEART RATE: 66 BPM | BODY MASS INDEX: 36.86 KG/M2 | SYSTOLIC BLOOD PRESSURE: 116 MMHG | TEMPERATURE: 97.9 F | RESPIRATION RATE: 16 BRPM | DIASTOLIC BLOOD PRESSURE: 72 MMHG

## 2024-11-06 DIAGNOSIS — E11.65 TYPE 2 DIABETES MELLITUS WITH HYPERGLYCEMIA, WITH LONG-TERM CURRENT USE OF INSULIN: Primary | ICD-10-CM

## 2024-11-06 DIAGNOSIS — Z79.4 TYPE 2 DIABETES MELLITUS WITH HYPERGLYCEMIA, WITH LONG-TERM CURRENT USE OF INSULIN: Primary | ICD-10-CM

## 2024-11-06 PROCEDURE — 99309 SBSQ NF CARE MODERATE MDM 30: CPT | Performed by: NURSE PRACTITIONER

## 2024-11-06 ASSESSMENT — ENCOUNTER SYMPTOMS: CONFUSION: 1

## 2024-11-06 NOTE — LETTER
Patient: Brianna Argueta  : 1950    Encounter Date: 2024    Subjective  Patient ID: Brianna Argueta is a 74 y.o. female who presents for Diabetes.    Following up with patient who had a HGA1c done yesterday which was increased from her previous level. Medications were reviewed and patient had her NPH stopped in July. Spoke with staff and pharmacy who were unsure why this occurred. Patient has been on long term insulin for DM. She states that she feels fine and has been eating what she usually eats. She does stay in bed and sleep more often than she had been. Accuchecks were reviewed in [facility].          Review of Systems   Psychiatric/Behavioral:  Positive for confusion.        Objective  /72   Pulse 66   Temp 36.6 °C (97.9 °F)   Resp 16   Wt 94.4 kg (208 lb 1.6 oz)   SpO2 99%   BMI 36.86 kg/m²     Physical Exam  Constitutional:       General: She is not in acute distress.     Appearance: She is obese. She is not ill-appearing.   HENT:      Mouth/Throat:      Mouth: Mucous membranes are moist.   Eyes:      Conjunctiva/sclera: Conjunctivae normal.   Musculoskeletal:      Comments: Slow unsteady gait   Skin:     General: Skin is warm and dry.   Neurological:      Mental Status: She is alert.   Psychiatric:         Mood and Affect: Affect is flat.         Assessment/Plan  Diagnoses and all orders for this visit:  Type 2 diabetes mellitus with hyperglycemia, with long-term current use of insulin  Comments:  chronic/worsened: cont with jardiance, metformin, amaryl, novolog; add lantus 20 units qd, monitor accuchecks, HGA1C in 3 months           Electronically Signed By: MEAGAN Banerjee   24  6:42 PM

## 2024-11-06 NOTE — PROGRESS NOTES
Subjective   Patient ID: Brianna Argueta is a 74 y.o. female who presents for Diabetes.    Following up with patient who had a HGA1c done yesterday which was increased from her previous level. Medications were reviewed and patient had her NPH stopped in July. Spoke with staff and pharmacy who were unsure why this occurred. Patient has been on long term insulin for DM. She states that she feels fine and has been eating what she usually eats. She does stay in bed and sleep more often than she had been. Accuchecks were reviewed in P[facility].          Review of Systems   Psychiatric/Behavioral:  Positive for confusion.        Objective   /72   Pulse 66   Temp 36.6 °C (97.9 °F)   Resp 16   Wt 94.4 kg (208 lb 1.6 oz)   SpO2 99%   BMI 36.86 kg/m²     Physical Exam  Constitutional:       General: She is not in acute distress.     Appearance: She is obese. She is not ill-appearing.   HENT:      Mouth/Throat:      Mouth: Mucous membranes are moist.   Eyes:      Conjunctiva/sclera: Conjunctivae normal.   Musculoskeletal:      Comments: Slow unsteady gait   Skin:     General: Skin is warm and dry.   Neurological:      Mental Status: She is alert.   Psychiatric:         Mood and Affect: Affect is flat.         Assessment/Plan   Diagnoses and all orders for this visit:  Type 2 diabetes mellitus with hyperglycemia, with long-term current use of insulin  Comments:  chronic/worsened: cont with jardiance, metformin, amaryl, novolog; add lantus 20 units qd, monitor accuchecks, HGA1C in 3 months

## 2024-11-13 ENCOUNTER — NURSING HOME VISIT (OUTPATIENT)
Dept: POST ACUTE CARE | Facility: EXTERNAL LOCATION | Age: 74
End: 2024-11-13
Payer: COMMERCIAL

## 2024-11-13 VITALS
BODY MASS INDEX: 36.86 KG/M2 | OXYGEN SATURATION: 99 % | HEART RATE: 66 BPM | SYSTOLIC BLOOD PRESSURE: 116 MMHG | DIASTOLIC BLOOD PRESSURE: 72 MMHG | RESPIRATION RATE: 18 BRPM | WEIGHT: 208.1 LBS | TEMPERATURE: 97.9 F

## 2024-11-13 DIAGNOSIS — Z79.4 TYPE 2 DIABETES MELLITUS WITH HYPERGLYCEMIA, WITH LONG-TERM CURRENT USE OF INSULIN: Primary | ICD-10-CM

## 2024-11-13 DIAGNOSIS — E11.65 TYPE 2 DIABETES MELLITUS WITH HYPERGLYCEMIA, WITH LONG-TERM CURRENT USE OF INSULIN: Primary | ICD-10-CM

## 2024-11-13 PROCEDURE — 99308 SBSQ NF CARE LOW MDM 20: CPT | Performed by: NURSE PRACTITIONER

## 2024-11-13 NOTE — PROGRESS NOTES
Subjective   Patient ID: Brianna Argueta is a 74 y.o. female who presents for Diabetes.    Following up with patient who started lantus last week. She has had some lower blodd sugars in the morning. She states that she feels fine and does not notice that they are low. Staff states that she has been eating and drinking like she usually does. She has had some higher blood sugars throughout the day         Review of Systems   All other systems reviewed and are negative.      Objective   /72   Pulse 66   Temp 36.6 °C (97.9 °F)   Resp 18   Wt 94.4 kg (208 lb 1.6 oz)   SpO2 99%   BMI 36.86 kg/m²     Physical Exam  Constitutional:       Appearance: She is obese.   HENT:      Mouth/Throat:      Mouth: Mucous membranes are moist.   Eyes:      Conjunctiva/sclera: Conjunctivae normal.   Pulmonary:      Effort: Pulmonary effort is normal.   Musculoskeletal:      Comments: Shuffled gait   Skin:     General: Skin is warm and dry.   Neurological:      Mental Status: She is alert.   Psychiatric:         Mood and Affect: Affect is flat.         Assessment/Plan   Diagnoses and all orders for this visit:  Type 2 diabetes mellitus with hyperglycemia, with long-term current use of insulin  Comments:  decrease lantus to 18 units sq qd, cont with accuchecks, novolog, metformin

## 2024-11-13 NOTE — LETTER
Patient: Brianna Argueta  : 1950    Encounter Date: 2024    Subjective  Patient ID: Brianna Argueta is a 74 y.o. female who presents for Diabetes.    Following up with patient who started lantus last week. She has had some lower blodd sugars in the morning. She states that she feels fine and does not notice that they are low. Staff states that she has been eating and drinking like she usually does. She has had some higher blood sugars throughout the day         Review of Systems   All other systems reviewed and are negative.      Objective  /72   Pulse 66   Temp 36.6 °C (97.9 °F)   Resp 18   Wt 94.4 kg (208 lb 1.6 oz)   SpO2 99%   BMI 36.86 kg/m²     Physical Exam  Constitutional:       Appearance: She is obese.   HENT:      Mouth/Throat:      Mouth: Mucous membranes are moist.   Eyes:      Conjunctiva/sclera: Conjunctivae normal.   Pulmonary:      Effort: Pulmonary effort is normal.   Musculoskeletal:      Comments: Shuffled gait   Skin:     General: Skin is warm and dry.   Neurological:      Mental Status: She is alert.   Psychiatric:         Mood and Affect: Affect is flat.         Assessment/Plan  Diagnoses and all orders for this visit:  Type 2 diabetes mellitus with hyperglycemia, with long-term current use of insulin  Comments:  decrease lantus to 18 units sq qd, cont with accuchecks, novolog, metformin           Electronically Signed By: MEAGAN Banerjee   24 12:58 PM

## 2024-12-02 ENCOUNTER — NURSING HOME VISIT (OUTPATIENT)
Dept: POST ACUTE CARE | Facility: EXTERNAL LOCATION | Age: 74
End: 2024-12-02
Payer: COMMERCIAL

## 2024-12-02 DIAGNOSIS — E78.2 MIXED HYPERLIPIDEMIA: ICD-10-CM

## 2024-12-02 DIAGNOSIS — F20.0 PARANOID SCHIZOPHRENIA (MULTI): ICD-10-CM

## 2024-12-02 DIAGNOSIS — N18.32 STAGE 3B CHRONIC KIDNEY DISEASE (MULTI): ICD-10-CM

## 2024-12-02 DIAGNOSIS — E11.65 TYPE 2 DIABETES MELLITUS WITH HYPERGLYCEMIA, WITH LONG-TERM CURRENT USE OF INSULIN: Primary | ICD-10-CM

## 2024-12-02 DIAGNOSIS — I10 PRIMARY HYPERTENSION: ICD-10-CM

## 2024-12-02 DIAGNOSIS — I89.0 LYMPHEDEMA: ICD-10-CM

## 2024-12-02 DIAGNOSIS — Z79.4 TYPE 2 DIABETES MELLITUS WITH HYPERGLYCEMIA, WITH LONG-TERM CURRENT USE OF INSULIN: Primary | ICD-10-CM

## 2024-12-02 PROCEDURE — 99309 SBSQ NF CARE MODERATE MDM 30: CPT | Performed by: FAMILY MEDICINE

## 2024-12-02 NOTE — LETTER
Patient: Brianna Argueta  : 1950    Encounter Date: 2024    Subjective  Patient ID: Brianna Argueta is a 74 y.o. female who is long term resident being seen and evaluated for multiple medical problems.    HPI patient being seen for monthly visit.  She did have laboratory studies showing increase in A1c. She also has chronic renal disease and is on farxiga. Gfr 44. Total chol 112. No recent cellulitic issues.       Review of Systems   Constitutional:  Positive for fatigue. Negative for chills and fever.   HENT:  Positive for hearing loss. Negative for congestion and sore throat.    Eyes:  Negative for visual disturbance.   Respiratory:  Positive for cough and shortness of breath (At baseline). Negative for wheezing.    Cardiovascular:  Positive for leg swelling. Negative for chest pain and palpitations.   Gastrointestinal:  Negative for abdominal pain, constipation, diarrhea, nausea and vomiting.   Genitourinary:  Negative for dysuria, frequency, hematuria and urgency.   Musculoskeletal:  Positive for gait problem.   Skin:  Negative for rash.   Neurological:  Negative for dizziness, syncope and headaches.   Psychiatric/Behavioral:  Positive for confusion. Negative for dysphoric mood. The patient is not nervous/anxious.        Objective  There were no vitals taken for this visit.    Physical Exam  Vitals reviewed: 122/56 97.7 74 wt 208-nov wt.   Constitutional:       General: She is not in acute distress.     Appearance: She is not ill-appearing.   HENT:      Head: Normocephalic.      Nose: No congestion.      Mouth/Throat:      Mouth: Mucous membranes are dry.      Pharynx: Oropharynx is clear.   Eyes:      General: No scleral icterus.     Conjunctiva/sclera: Conjunctivae normal.   Cardiovascular:      Rate and Rhythm: Normal rate and regular rhythm.      Heart sounds:      No gallop.   Pulmonary:      Effort: Pulmonary effort is normal.      Breath sounds: No wheezing or rhonchi.   Abdominal:      General:  Bowel sounds are normal.      Tenderness: There is no abdominal tenderness. There is no rebound.   Musculoskeletal:      Cervical back: Neck supple.      Right lower leg: Edema present.      Left lower leg: Edema present.   Lymphadenopathy:      Cervical: No cervical adenopathy.   Skin:     General: Skin is warm and dry.   Neurological:      Mental Status: Mental status is at baseline.      Motor: Weakness present.      Gait: Gait abnormal.         Assessment/Plan  Problem List Items Addressed This Visit             ICD-10-CM    Hyperlipidemia E78.5    Hypertension I10    Lymphedema I89.0    Paranoid schizophrenia (Multi) F20.0    Type 2 diabetes mellitus with hyperglycemia, with long-term current use of insulin - Primary E11.65, Z79.4     Other Visit Diagnoses         Codes    Stage 3b chronic kidney disease (Multi)     N18.32          Increase farxiga to 25mg, decrease glimepiride to daily and possibly discontinue to minimize med list  Recheck A1c in feb  Continue to follow-up with psychiatry for paranoid schizophrenia.   Goals    None           Electronically Signed By: Mari Peters MD   12/3/24 12:41 PM

## 2024-12-03 ASSESSMENT — ENCOUNTER SYMPTOMS
FREQUENCY: 0
HEMATURIA: 0
DYSPHORIC MOOD: 0
CONSTIPATION: 0
CHILLS: 0
HEADACHES: 0
COUGH: 1
ABDOMINAL PAIN: 0
FEVER: 0
DYSURIA: 0
DIZZINESS: 0
VOMITING: 0
FATIGUE: 1
SORE THROAT: 0
SHORTNESS OF BREATH: 1
DIARRHEA: 0
CONFUSION: 1
WHEEZING: 0
PALPITATIONS: 0
NERVOUS/ANXIOUS: 0
NAUSEA: 0

## 2024-12-03 NOTE — PROGRESS NOTES
Subjective   Patient ID: Brianna Argueta is a 74 y.o. female who is long term resident being seen and evaluated for multiple medical problems.    HPI patient being seen for monthly visit.  She did have laboratory studies showing increase in A1c. She also has chronic renal disease and is on farxiga. Gfr 44. Total chol 112. No recent cellulitic issues.       Review of Systems   Constitutional:  Positive for fatigue. Negative for chills and fever.   HENT:  Positive for hearing loss. Negative for congestion and sore throat.    Eyes:  Negative for visual disturbance.   Respiratory:  Positive for cough and shortness of breath (At baseline). Negative for wheezing.    Cardiovascular:  Positive for leg swelling. Negative for chest pain and palpitations.   Gastrointestinal:  Negative for abdominal pain, constipation, diarrhea, nausea and vomiting.   Genitourinary:  Negative for dysuria, frequency, hematuria and urgency.   Musculoskeletal:  Positive for gait problem.   Skin:  Negative for rash.   Neurological:  Negative for dizziness, syncope and headaches.   Psychiatric/Behavioral:  Positive for confusion. Negative for dysphoric mood. The patient is not nervous/anxious.        Objective   There were no vitals taken for this visit.    Physical Exam  Vitals reviewed: 122/56 97.7 74 wt 208-nov wt.   Constitutional:       General: She is not in acute distress.     Appearance: She is not ill-appearing.   HENT:      Head: Normocephalic.      Nose: No congestion.      Mouth/Throat:      Mouth: Mucous membranes are dry.      Pharynx: Oropharynx is clear.   Eyes:      General: No scleral icterus.     Conjunctiva/sclera: Conjunctivae normal.   Cardiovascular:      Rate and Rhythm: Normal rate and regular rhythm.      Heart sounds:      No gallop.   Pulmonary:      Effort: Pulmonary effort is normal.      Breath sounds: No wheezing or rhonchi.   Abdominal:      General: Bowel sounds are normal.      Tenderness: There is no abdominal  tenderness. There is no rebound.   Musculoskeletal:      Cervical back: Neck supple.      Right lower leg: Edema present.      Left lower leg: Edema present.   Lymphadenopathy:      Cervical: No cervical adenopathy.   Skin:     General: Skin is warm and dry.   Neurological:      Mental Status: Mental status is at baseline.      Motor: Weakness present.      Gait: Gait abnormal.         Assessment/Plan   Problem List Items Addressed This Visit             ICD-10-CM    Hyperlipidemia E78.5    Hypertension I10    Lymphedema I89.0    Paranoid schizophrenia (Multi) F20.0    Type 2 diabetes mellitus with hyperglycemia, with long-term current use of insulin - Primary E11.65, Z79.4     Other Visit Diagnoses         Codes    Stage 3b chronic kidney disease (Multi)     N18.32          Increase farxiga to 25mg, decrease glimepiride to daily and possibly discontinue to minimize med list  Recheck A1c in feb  Continue to follow-up with psychiatry for paranoid schizophrenia.   Goals    None

## 2025-01-06 ENCOUNTER — NURSING HOME VISIT (OUTPATIENT)
Dept: POST ACUTE CARE | Facility: EXTERNAL LOCATION | Age: 75
End: 2025-01-06
Payer: COMMERCIAL

## 2025-01-06 DIAGNOSIS — F20.0 PARANOID SCHIZOPHRENIA (MULTI): ICD-10-CM

## 2025-01-06 DIAGNOSIS — D63.1 ANEMIA DUE TO STAGE 3A CHRONIC KIDNEY DISEASE (MULTI): ICD-10-CM

## 2025-01-06 DIAGNOSIS — E11.65 TYPE 2 DIABETES MELLITUS WITH HYPERGLYCEMIA, WITH LONG-TERM CURRENT USE OF INSULIN: Primary | ICD-10-CM

## 2025-01-06 DIAGNOSIS — N18.31 ANEMIA DUE TO STAGE 3A CHRONIC KIDNEY DISEASE (MULTI): ICD-10-CM

## 2025-01-06 DIAGNOSIS — I89.0 LYMPHEDEMA: ICD-10-CM

## 2025-01-06 DIAGNOSIS — I10 PRIMARY HYPERTENSION: ICD-10-CM

## 2025-01-06 DIAGNOSIS — Z79.4 TYPE 2 DIABETES MELLITUS WITH HYPERGLYCEMIA, WITH LONG-TERM CURRENT USE OF INSULIN: Primary | ICD-10-CM

## 2025-01-06 PROCEDURE — 99309 SBSQ NF CARE MODERATE MDM 30: CPT | Performed by: FAMILY MEDICINE

## 2025-01-06 ASSESSMENT — ENCOUNTER SYMPTOMS
SHORTNESS OF BREATH: 1
DYSURIA: 0
SORE THROAT: 0
DIARRHEA: 0
ABDOMINAL PAIN: 0
DIZZINESS: 0
PALPITATIONS: 0
DYSPHORIC MOOD: 0
CONSTIPATION: 0
HEMATURIA: 0
CONFUSION: 1
CHILLS: 0
VOMITING: 0
FEVER: 0
HEADACHES: 0
NAUSEA: 0
COUGH: 1
NERVOUS/ANXIOUS: 0
WHEEZING: 0
FATIGUE: 1
FREQUENCY: 0

## 2025-01-06 NOTE — LETTER
Patient: Brianna Argueta  : 1950    Encounter Date: 2025    Subjective  Patient ID: Brianna Argueta is a 74 y.o. female who is long term resident being seen and evaluated for multiple medical problems.    HPI patient being seen for monthly visit.  She did have laboratory studies showing increase in A1c in November and meds adjusted with recheck complete labs due in feb and ordered today.        Review of Systems   Constitutional:  Positive for fatigue. Negative for chills and fever.   HENT:  Positive for hearing loss. Negative for congestion and sore throat.    Eyes:  Negative for visual disturbance.   Respiratory:  Positive for cough and shortness of breath (At baseline). Negative for wheezing.    Cardiovascular:  Positive for leg swelling. Negative for chest pain and palpitations.   Gastrointestinal:  Negative for abdominal pain, constipation, diarrhea, nausea and vomiting.   Genitourinary:  Negative for dysuria, frequency, hematuria and urgency.   Musculoskeletal:  Positive for gait problem.   Skin:  Negative for rash.   Neurological:  Negative for dizziness, syncope and headaches.   Psychiatric/Behavioral:  Positive for confusion. Negative for dysphoric mood. The patient is not nervous/anxious.        Objective  There were no vitals taken for this visit.    Physical Exam  Vitals reviewed: 127/62 97.8 71 wt 208-stable.   Constitutional:       General: She is not in acute distress.     Appearance: She is not ill-appearing.   HENT:      Head: Normocephalic.      Nose: No congestion.      Mouth/Throat:      Mouth: Mucous membranes are dry.      Pharynx: Oropharynx is clear.   Eyes:      General: No scleral icterus.     Conjunctiva/sclera: Conjunctivae normal.   Cardiovascular:      Rate and Rhythm: Normal rate and regular rhythm.      Heart sounds:      No gallop.   Pulmonary:      Effort: Pulmonary effort is normal.      Breath sounds: No wheezing or rhonchi.   Abdominal:      General: Bowel sounds are  normal.      Tenderness: There is no abdominal tenderness. There is no rebound.   Musculoskeletal:      Cervical back: Neck supple.      Right lower leg: Edema present.      Left lower leg: Edema present.   Lymphadenopathy:      Cervical: No cervical adenopathy.   Skin:     General: Skin is warm and dry.   Neurological:      Mental Status: Mental status is at baseline.      Motor: Weakness present.      Gait: Gait abnormal.         Assessment/Plan  Problem List Items Addressed This Visit             ICD-10-CM    Hypertension I10    Lymphedema I89.0    Paranoid schizophrenia (Multi) F20.0    Type 2 diabetes mellitus with hyperglycemia, with long-term current use of insulin - Primary E11.65, Z79.4     Other Visit Diagnoses         Codes    Anemia due to stage 3a chronic kidney disease (Multi)     N18.31, D63.1          On Increase farxiga to 25mg, decrease glimepiride to daily   Recheck A1c-may be able to stop glimepiride  continue to follow-up with psychiatry for paranoid schizophrenia.   Goals    None           Electronically Signed By: Mari Peters MD   1/6/25  9:03 PM

## 2025-01-07 NOTE — PROGRESS NOTES
Subjective   Patient ID: Brianna Argueta is a 74 y.o. female who is long term resident being seen and evaluated for multiple medical problems.    HPI patient being seen for monthly visit.  She did have laboratory studies showing increase in A1c in November and meds adjusted with recheck complete labs due in feb and ordered today.        Review of Systems   Constitutional:  Positive for fatigue. Negative for chills and fever.   HENT:  Positive for hearing loss. Negative for congestion and sore throat.    Eyes:  Negative for visual disturbance.   Respiratory:  Positive for cough and shortness of breath (At baseline). Negative for wheezing.    Cardiovascular:  Positive for leg swelling. Negative for chest pain and palpitations.   Gastrointestinal:  Negative for abdominal pain, constipation, diarrhea, nausea and vomiting.   Genitourinary:  Negative for dysuria, frequency, hematuria and urgency.   Musculoskeletal:  Positive for gait problem.   Skin:  Negative for rash.   Neurological:  Negative for dizziness, syncope and headaches.   Psychiatric/Behavioral:  Positive for confusion. Negative for dysphoric mood. The patient is not nervous/anxious.        Objective   There were no vitals taken for this visit.    Physical Exam  Vitals reviewed: 127/62 97.8 71 wt 208-stable.   Constitutional:       General: She is not in acute distress.     Appearance: She is not ill-appearing.   HENT:      Head: Normocephalic.      Nose: No congestion.      Mouth/Throat:      Mouth: Mucous membranes are dry.      Pharynx: Oropharynx is clear.   Eyes:      General: No scleral icterus.     Conjunctiva/sclera: Conjunctivae normal.   Cardiovascular:      Rate and Rhythm: Normal rate and regular rhythm.      Heart sounds:      No gallop.   Pulmonary:      Effort: Pulmonary effort is normal.      Breath sounds: No wheezing or rhonchi.   Abdominal:      General: Bowel sounds are normal.      Tenderness: There is no abdominal tenderness. There is no  rebound.   Musculoskeletal:      Cervical back: Neck supple.      Right lower leg: Edema present.      Left lower leg: Edema present.   Lymphadenopathy:      Cervical: No cervical adenopathy.   Skin:     General: Skin is warm and dry.   Neurological:      Mental Status: Mental status is at baseline.      Motor: Weakness present.      Gait: Gait abnormal.         Assessment/Plan   Problem List Items Addressed This Visit             ICD-10-CM    Hypertension I10    Lymphedema I89.0    Paranoid schizophrenia (Multi) F20.0    Type 2 diabetes mellitus with hyperglycemia, with long-term current use of insulin - Primary E11.65, Z79.4     Other Visit Diagnoses         Codes    Anemia due to stage 3a chronic kidney disease (Multi)     N18.31, D63.1          On Increase farxiga to 25mg, decrease glimepiride to daily   Recheck A1c-may be able to stop glimepiride  continue to follow-up with psychiatry for paranoid schizophrenia.   Goals    None

## 2025-02-03 ENCOUNTER — NURSING HOME VISIT (OUTPATIENT)
Dept: POST ACUTE CARE | Facility: EXTERNAL LOCATION | Age: 75
End: 2025-02-03
Payer: COMMERCIAL

## 2025-02-03 DIAGNOSIS — I10 PRIMARY HYPERTENSION: ICD-10-CM

## 2025-02-03 DIAGNOSIS — Z79.4 TYPE 2 DIABETES MELLITUS WITH HYPERGLYCEMIA, WITH LONG-TERM CURRENT USE OF INSULIN: Primary | ICD-10-CM

## 2025-02-03 DIAGNOSIS — F20.0 PARANOID SCHIZOPHRENIA (MULTI): ICD-10-CM

## 2025-02-03 DIAGNOSIS — E78.2 MIXED HYPERLIPIDEMIA: ICD-10-CM

## 2025-02-03 DIAGNOSIS — F31.75 BIPOLAR DISORDER, IN PARTIAL REMISSION, MOST RECENT EPISODE DEPRESSED (MULTI): ICD-10-CM

## 2025-02-03 DIAGNOSIS — E11.65 TYPE 2 DIABETES MELLITUS WITH HYPERGLYCEMIA, WITH LONG-TERM CURRENT USE OF INSULIN: Primary | ICD-10-CM

## 2025-02-03 PROCEDURE — 99309 SBSQ NF CARE MODERATE MDM 30: CPT | Performed by: FAMILY MEDICINE

## 2025-02-03 ASSESSMENT — ENCOUNTER SYMPTOMS
FATIGUE: 1
CONSTIPATION: 0
HEMATURIA: 0
NERVOUS/ANXIOUS: 0
DYSURIA: 0
ABDOMINAL PAIN: 0
NAUSEA: 0
SORE THROAT: 0
FREQUENCY: 0
CHILLS: 0
SHORTNESS OF BREATH: 1
DIARRHEA: 0
DIZZINESS: 0
FEVER: 0
HEADACHES: 0
VOMITING: 0
WHEEZING: 0
COUGH: 1
DYSPHORIC MOOD: 0
CONFUSION: 1
PALPITATIONS: 0

## 2025-02-03 NOTE — LETTER
Patient: Brianna Argueta  : 1950    Encounter Date: 2025    Subjective  Patient ID: Brianna Argueta is a 75 y.o. female who is long term resident being seen and evaluated for multiple medical problems.    HPI patient being seen for monthly visit.  She did have laboratory studies showing increase in A1c in November and meds adjusted with recheck complete labs due this month and pending.         Review of Systems   Constitutional:  Positive for fatigue. Negative for chills and fever.   HENT:  Positive for hearing loss. Negative for congestion and sore throat.    Eyes:  Negative for visual disturbance.   Respiratory:  Positive for cough and shortness of breath (At baseline). Negative for wheezing.    Cardiovascular:  Positive for leg swelling. Negative for chest pain and palpitations.   Gastrointestinal:  Negative for abdominal pain, constipation, diarrhea, nausea and vomiting.   Genitourinary:  Negative for dysuria, frequency, hematuria and urgency.   Musculoskeletal:  Positive for gait problem.   Skin:  Negative for rash.   Neurological:  Negative for dizziness, syncope and headaches.   Psychiatric/Behavioral:  Positive for confusion. Negative for dysphoric mood. The patient is not nervous/anxious.        Objective  There were no vitals taken for this visit.    Physical Exam  Vitals reviewed: 117/59 97.6 69 wt 209-up 1 lb.   Constitutional:       General: She is not in acute distress.     Appearance: She is not ill-appearing.   HENT:      Head: Normocephalic.      Nose: No congestion.      Mouth/Throat:      Mouth: Mucous membranes are dry.      Pharynx: Oropharynx is clear.   Eyes:      General: No scleral icterus.     Conjunctiva/sclera: Conjunctivae normal.   Cardiovascular:      Rate and Rhythm: Normal rate and regular rhythm.      Heart sounds:      No gallop.   Pulmonary:      Effort: Pulmonary effort is normal.      Breath sounds: No wheezing or rhonchi.   Abdominal:      General: Bowel sounds are  normal.      Tenderness: There is no abdominal tenderness. There is no rebound.   Musculoskeletal:      Cervical back: Neck supple.      Right lower leg: Edema present.      Left lower leg: Edema present.   Lymphadenopathy:      Cervical: No cervical adenopathy.   Skin:     General: Skin is warm and dry.   Neurological:      Mental Status: Mental status is at baseline.      Motor: Weakness present.      Gait: Gait abnormal.         Assessment/Plan  Problem List Items Addressed This Visit             ICD-10-CM    Bipolar mood disorder (Multi) F31.9    Hyperlipidemia E78.5    Hypertension I10    Paranoid schizophrenia (Multi) F20.0    Type 2 diabetes mellitus with hyperglycemia, with long-term current use of insulin - Primary E11.65, Z79.4       On Increase farxiga to 25mg, decrease glimepiride to daily   Recheck [id number] for this week -may be able to stop glimepiride  continue to follow-up with psychiatry for paranoid schizophrenia.   Goals    None           Electronically Signed By: Mari Peters MD   2/3/25 11:01 PM

## 2025-02-04 NOTE — PROGRESS NOTES
Subjective   Patient ID: Brianna Argueta is a 75 y.o. female who is long term resident being seen and evaluated for multiple medical problems.    HPI patient being seen for monthly visit.  She did have laboratory studies showing increase in A1c in November and meds adjusted with recheck complete labs due this month and pending.         Review of Systems   Constitutional:  Positive for fatigue. Negative for chills and fever.   HENT:  Positive for hearing loss. Negative for congestion and sore throat.    Eyes:  Negative for visual disturbance.   Respiratory:  Positive for cough and shortness of breath (At baseline). Negative for wheezing.    Cardiovascular:  Positive for leg swelling. Negative for chest pain and palpitations.   Gastrointestinal:  Negative for abdominal pain, constipation, diarrhea, nausea and vomiting.   Genitourinary:  Negative for dysuria, frequency, hematuria and urgency.   Musculoskeletal:  Positive for gait problem.   Skin:  Negative for rash.   Neurological:  Negative for dizziness, syncope and headaches.   Psychiatric/Behavioral:  Positive for confusion. Negative for dysphoric mood. The patient is not nervous/anxious.        Objective   There were no vitals taken for this visit.    Physical Exam  Vitals reviewed: 117/59 97.6 69 wt 209-up 1 lb.   Constitutional:       General: She is not in acute distress.     Appearance: She is not ill-appearing.   HENT:      Head: Normocephalic.      Nose: No congestion.      Mouth/Throat:      Mouth: Mucous membranes are dry.      Pharynx: Oropharynx is clear.   Eyes:      General: No scleral icterus.     Conjunctiva/sclera: Conjunctivae normal.   Cardiovascular:      Rate and Rhythm: Normal rate and regular rhythm.      Heart sounds:      No gallop.   Pulmonary:      Effort: Pulmonary effort is normal.      Breath sounds: No wheezing or rhonchi.   Abdominal:      General: Bowel sounds are normal.      Tenderness: There is no abdominal tenderness. There is no  rebound.   Musculoskeletal:      Cervical back: Neck supple.      Right lower leg: Edema present.      Left lower leg: Edema present.   Lymphadenopathy:      Cervical: No cervical adenopathy.   Skin:     General: Skin is warm and dry.   Neurological:      Mental Status: Mental status is at baseline.      Motor: Weakness present.      Gait: Gait abnormal.         Assessment/Plan   Problem List Items Addressed This Visit             ICD-10-CM    Bipolar mood disorder (Multi) F31.9    Hyperlipidemia E78.5    Hypertension I10    Paranoid schizophrenia (Multi) F20.0    Type 2 diabetes mellitus with hyperglycemia, with long-term current use of insulin - Primary E11.65, Z79.4       On Increase farxiga to 25mg, decrease glimepiride to daily   Recheck [id number] for this week -may be able to stop glimepiride  continue to follow-up with psychiatry for paranoid schizophrenia.   Goals    None

## 2025-02-13 ENCOUNTER — NURSING HOME VISIT (OUTPATIENT)
Dept: POST ACUTE CARE | Facility: EXTERNAL LOCATION | Age: 75
End: 2025-02-13
Payer: COMMERCIAL

## 2025-02-13 DIAGNOSIS — R05.2 SUBACUTE COUGH: ICD-10-CM

## 2025-02-13 DIAGNOSIS — N30.00 ACUTE CYSTITIS WITHOUT HEMATURIA: Primary | ICD-10-CM

## 2025-02-13 PROCEDURE — 99309 SBSQ NF CARE MODERATE MDM 30: CPT | Performed by: NURSE PRACTITIONER

## 2025-02-13 NOTE — LETTER
Patient: Brianna Argueta  : 1950    Encounter Date: 2025    Subjective  Patient ID: Brianna Argueta is a 75 y.o. female who presents for Cough.    Following up with patient who has a cough. Staff states that she has not been acting like herself. A UA was done and  final cultures results came back today positive for klebsiella. She will start antibiotics. She has had a slight cough. Patient denies any other URI symptoms. She states that she feels OK otherwise. No fever or chills         Review of Systems   Respiratory:  Positive for cough.    Gastrointestinal:  Positive for diarrhea.   Musculoskeletal:  Positive for gait problem.       Objective  /54   Pulse 69   Temp 36.4 °C (97.6 °F)   Resp 17   Wt 94.9 kg (209 lb 4.8 oz)   SpO2 95%   BMI 37.08 kg/m²     Physical Exam  Constitutional:       General: She is not in acute distress.     Appearance: She is obese. She is ill-appearing.   HENT:      Mouth/Throat:      Mouth: Mucous membranes are moist.   Eyes:      Conjunctiva/sclera: Conjunctivae normal.   Cardiovascular:      Rate and Rhythm: Normal rate and regular rhythm.   Pulmonary:      Effort: Pulmonary effort is normal.      Breath sounds: Rhonchi present. No wheezing or rales.   Abdominal:      General: Bowel sounds are normal.   Musculoskeletal:      Comments: Able to stand with assistance   Skin:     General: Skin is warm and dry.   Neurological:      Mental Status: She is alert. Mental status is at baseline.   Psychiatric:         Mood and Affect: Affect is flat.         Assessment/Plan  Diagnoses and all orders for this visit:  Acute cystitis without hematuria  Comments:  antibiotics started, monitor for adverse side effects  Subacute cough  Comments:  CXR, robitussin as needed           Electronically Signed By: MEAGAN Banerjee   25  4:19 PM

## 2025-02-18 VITALS
OXYGEN SATURATION: 95 % | DIASTOLIC BLOOD PRESSURE: 54 MMHG | RESPIRATION RATE: 17 BRPM | WEIGHT: 209.3 LBS | SYSTOLIC BLOOD PRESSURE: 117 MMHG | TEMPERATURE: 97.6 F | HEART RATE: 69 BPM | BODY MASS INDEX: 37.08 KG/M2

## 2025-02-18 ASSESSMENT — ENCOUNTER SYMPTOMS
DIARRHEA: 1
COUGH: 1

## 2025-02-18 NOTE — PROGRESS NOTES
Subjective   Patient ID: Brianna Argueta is a 75 y.o. female who presents for Cough.    Following up with patient who has a cough. Staff states that she has not been acting like herself. A UA was done and  final cultures results came back today positive for klebsiella. She will start antibiotics. She has had a slight cough. Patient denies any other URI symptoms. She states that she feels OK otherwise. No fever or chills         Review of Systems   Respiratory:  Positive for cough.    Gastrointestinal:  Positive for diarrhea.   Musculoskeletal:  Positive for gait problem.       Objective   /54   Pulse 69   Temp 36.4 °C (97.6 °F)   Resp 17   Wt 94.9 kg (209 lb 4.8 oz)   SpO2 95%   BMI 37.08 kg/m²     Physical Exam  Constitutional:       General: She is not in acute distress.     Appearance: She is obese. She is ill-appearing.   HENT:      Mouth/Throat:      Mouth: Mucous membranes are moist.   Eyes:      Conjunctiva/sclera: Conjunctivae normal.   Cardiovascular:      Rate and Rhythm: Normal rate and regular rhythm.   Pulmonary:      Effort: Pulmonary effort is normal.      Breath sounds: Rhonchi present. No wheezing or rales.   Abdominal:      General: Bowel sounds are normal.   Musculoskeletal:      Comments: Able to stand with assistance   Skin:     General: Skin is warm and dry.   Neurological:      Mental Status: She is alert. Mental status is at baseline.   Psychiatric:         Mood and Affect: Affect is flat.         Assessment/Plan   Diagnoses and all orders for this visit:  Acute cystitis without hematuria  Comments:  antibiotics started, monitor for adverse side effects  Subacute cough  Comments:  CXR, robitussin as needed

## 2025-03-03 ENCOUNTER — NURSING HOME VISIT (OUTPATIENT)
Dept: POST ACUTE CARE | Facility: EXTERNAL LOCATION | Age: 75
End: 2025-03-03
Payer: COMMERCIAL

## 2025-03-03 DIAGNOSIS — F20.0 PARANOID SCHIZOPHRENIA (MULTI): Primary | ICD-10-CM

## 2025-03-03 DIAGNOSIS — I10 PRIMARY HYPERTENSION: ICD-10-CM

## 2025-03-03 DIAGNOSIS — Z79.4 TYPE 2 DIABETES MELLITUS WITH HYPERGLYCEMIA, WITH LONG-TERM CURRENT USE OF INSULIN: ICD-10-CM

## 2025-03-03 DIAGNOSIS — E11.65 TYPE 2 DIABETES MELLITUS WITH HYPERGLYCEMIA, WITH LONG-TERM CURRENT USE OF INSULIN: ICD-10-CM

## 2025-03-03 DIAGNOSIS — N18.32 STAGE 3B CHRONIC KIDNEY DISEASE (MULTI): ICD-10-CM

## 2025-03-03 PROCEDURE — 99309 SBSQ NF CARE MODERATE MDM 30: CPT | Performed by: FAMILY MEDICINE

## 2025-03-03 ASSESSMENT — ENCOUNTER SYMPTOMS
SORE THROAT: 0
COUGH: 1
VOMITING: 0
WHEEZING: 0
DYSURIA: 0
CONSTIPATION: 0
FATIGUE: 1
HEMATURIA: 0
DYSPHORIC MOOD: 0
CHILLS: 0
NAUSEA: 0
NERVOUS/ANXIOUS: 0
SHORTNESS OF BREATH: 1
FEVER: 0
DIARRHEA: 0
DIZZINESS: 0
CONFUSION: 1
FREQUENCY: 0
ABDOMINAL PAIN: 0
PALPITATIONS: 0
HEADACHES: 0

## 2025-03-03 NOTE — LETTER
Patient: Brianna Argueta  : 1950    Encounter Date: 2025    Subjective  Patient ID: Brianna Argueta is a 75 y.o. female who is long term resident being seen and evaluated for multiple medical problems.    HPI patient being seen for monthly visit.  She did have laboratory studies showed improved A1c 7.6, other labs normal. She completed tx for uti with klebsiella and no recurrence of symptoms.         Review of Systems   Constitutional:  Positive for fatigue. Negative for chills and fever.   HENT:  Positive for hearing loss. Negative for congestion and sore throat.    Eyes:  Negative for visual disturbance.   Respiratory:  Positive for cough and shortness of breath (At baseline). Negative for wheezing.    Cardiovascular:  Positive for leg swelling. Negative for chest pain and palpitations.   Gastrointestinal:  Negative for abdominal pain, constipation, diarrhea, nausea and vomiting.   Genitourinary:  Negative for dysuria, frequency, hematuria and urgency.   Musculoskeletal:  Positive for gait problem.   Skin:  Negative for rash.   Neurological:  Negative for dizziness, syncope and headaches.   Psychiatric/Behavioral:  Positive for confusion. Negative for dysphoric mood. The patient is not nervous/anxious.        Objective  There were no vitals taken for this visit.    Physical Exam  Vitals reviewed: 112/70 97.8 75 tae456-cy 2 lbs.   Constitutional:       General: She is not in acute distress.     Appearance: She is not ill-appearing.   HENT:      Head: Normocephalic.      Nose: No congestion.      Mouth/Throat:      Pharynx: Oropharynx is clear.   Eyes:      General: No scleral icterus.     Conjunctiva/sclera: Conjunctivae normal.   Cardiovascular:      Rate and Rhythm: Normal rate and regular rhythm.      Heart sounds:      No gallop.   Pulmonary:      Effort: Pulmonary effort is normal.      Breath sounds: No wheezing or rhonchi.   Abdominal:      General: Bowel sounds are normal.      Tenderness: There  is no abdominal tenderness. There is no rebound.   Musculoskeletal:      Cervical back: Neck supple.      Right lower leg: Edema present.      Left lower leg: Edema present.   Lymphadenopathy:      Cervical: No cervical adenopathy.   Skin:     General: Skin is warm and dry.   Neurological:      Mental Status: Mental status is at baseline.      Motor: Weakness present.      Gait: Gait abnormal.         Assessment/Plan  Problem List Items Addressed This Visit             ICD-10-CM    Hypertension I10    Paranoid schizophrenia (Multi) - Primary F20.0    Type 2 diabetes mellitus with hyperglycemia, with long-term current use of insulin E11.65, Z79.4     Other Visit Diagnoses         Codes    Stage 3b chronic kidney disease (Multi)     N18.32          A1c 7.6-continue farxiga and glimepiride  Recheck A1c may  Continue to follow-up with psychiatry for paranoid schizophrenia.   Goals    None           Electronically Signed By: Mari Peters MD   3/3/25 11:28 PM

## 2025-03-04 NOTE — PROGRESS NOTES
Subjective   Patient ID: Brianna Argueta is a 75 y.o. female who is long term resident being seen and evaluated for multiple medical problems.    HPI patient being seen for monthly visit.  She did have laboratory studies showed improved A1c 7.6, other labs normal. She completed tx for uti with klebsiella and no recurrence of symptoms.         Review of Systems   Constitutional:  Positive for fatigue. Negative for chills and fever.   HENT:  Positive for hearing loss. Negative for congestion and sore throat.    Eyes:  Negative for visual disturbance.   Respiratory:  Positive for cough and shortness of breath (At baseline). Negative for wheezing.    Cardiovascular:  Positive for leg swelling. Negative for chest pain and palpitations.   Gastrointestinal:  Negative for abdominal pain, constipation, diarrhea, nausea and vomiting.   Genitourinary:  Negative for dysuria, frequency, hematuria and urgency.   Musculoskeletal:  Positive for gait problem.   Skin:  Negative for rash.   Neurological:  Negative for dizziness, syncope and headaches.   Psychiatric/Behavioral:  Positive for confusion. Negative for dysphoric mood. The patient is not nervous/anxious.        Objective   There were no vitals taken for this visit.    Physical Exam  Vitals reviewed: 112/70 97.8 75 [id number] 2 lbs.   Constitutional:       General: She is not in acute distress.     Appearance: She is not ill-appearing.   HENT:      Head: Normocephalic.      Nose: No congestion.      Mouth/Throat:      Pharynx: Oropharynx is clear.   Eyes:      General: No scleral icterus.     Conjunctiva/sclera: Conjunctivae normal.   Cardiovascular:      Rate and Rhythm: Normal rate and regular rhythm.      Heart sounds:      No gallop.   Pulmonary:      Effort: Pulmonary effort is normal.      Breath sounds: No wheezing or rhonchi.   Abdominal:      General: Bowel sounds are normal.      Tenderness: There is no abdominal tenderness. There is no rebound.   Musculoskeletal:       Cervical back: Neck supple.      Right lower leg: Edema present.      Left lower leg: Edema present.   Lymphadenopathy:      Cervical: No cervical adenopathy.   Skin:     General: Skin is warm and dry.   Neurological:      Mental Status: Mental status is at baseline.      Motor: Weakness present.      Gait: Gait abnormal.         Assessment/Plan   Problem List Items Addressed This Visit             ICD-10-CM    Hypertension I10    Paranoid schizophrenia (Multi) - Primary F20.0    Type 2 diabetes mellitus with hyperglycemia, with long-term current use of insulin E11.65, Z79.4     Other Visit Diagnoses         Codes    Stage 3b chronic kidney disease (Multi)     N18.32          A1c 7.6-continue farxiga and glimepiride  Recheck A1c may  Continue to follow-up with psychiatry for paranoid schizophrenia.   Goals    None

## 2025-04-07 ENCOUNTER — NURSING HOME VISIT (OUTPATIENT)
Dept: POST ACUTE CARE | Facility: EXTERNAL LOCATION | Age: 75
End: 2025-04-07
Payer: COMMERCIAL

## 2025-04-07 DIAGNOSIS — E78.2 MIXED HYPERLIPIDEMIA: ICD-10-CM

## 2025-04-07 DIAGNOSIS — E11.65 TYPE 2 DIABETES MELLITUS WITH HYPERGLYCEMIA, WITH LONG-TERM CURRENT USE OF INSULIN: ICD-10-CM

## 2025-04-07 DIAGNOSIS — I89.0 LYMPHEDEMA: Primary | ICD-10-CM

## 2025-04-07 DIAGNOSIS — I10 PRIMARY HYPERTENSION: ICD-10-CM

## 2025-04-07 DIAGNOSIS — Z79.4 TYPE 2 DIABETES MELLITUS WITH HYPERGLYCEMIA, WITH LONG-TERM CURRENT USE OF INSULIN: ICD-10-CM

## 2025-04-07 PROCEDURE — 99309 SBSQ NF CARE MODERATE MDM 30: CPT | Performed by: FAMILY MEDICINE

## 2025-04-07 NOTE — LETTER
Patient: Brianna Argueta  : 1950    Encounter Date: 2025    Subjective  Patient ID: Brianna Argueta is a 75 y.o. female who is long term resident being seen and evaluated for multiple medical problems.    HPI patient being seen for monthly visit.  She did have laboratory studies showed improved A1c 7.6, other labs normal. She is doing fine this month, edema flucutuating, no chest pain or shortness of breath.          Review of Systems   Constitutional:  Positive for fatigue. Negative for chills and fever.   HENT:  Positive for hearing loss. Negative for congestion and sore throat.    Eyes:  Negative for visual disturbance.   Respiratory:  Positive for cough and shortness of breath (At baseline). Negative for wheezing.    Cardiovascular:  Positive for leg swelling. Negative for chest pain and palpitations.   Gastrointestinal:  Negative for abdominal pain, constipation, diarrhea, nausea and vomiting.   Genitourinary:  Negative for dysuria, frequency, hematuria and urgency.   Musculoskeletal:  Positive for gait problem.   Skin:  Negative for rash.   Neurological:  Negative for dizziness, syncope and headaches.   Psychiatric/Behavioral:  Positive for confusion. Negative for dysphoric mood. The patient is not nervous/anxious.        Objective  There were no vitals taken for this visit.    Physical Exam  Vitals reviewed: 128/65 97.8 72 wt 208-down 3 lbs.   Constitutional:       General: She is not in acute distress.     Appearance: She is not ill-appearing.   HENT:      Head: Normocephalic.      Nose: No congestion.      Mouth/Throat:      Pharynx: Oropharynx is clear.   Eyes:      General: No scleral icterus.     Conjunctiva/sclera: Conjunctivae normal.   Cardiovascular:      Rate and Rhythm: Normal rate and regular rhythm.      Heart sounds:      No gallop.   Pulmonary:      Effort: Pulmonary effort is normal.      Breath sounds: No wheezing or rhonchi.   Abdominal:      General: Bowel sounds are normal.       Tenderness: There is no abdominal tenderness. There is no rebound.   Musculoskeletal:      Cervical back: Neck supple.      Right lower leg: Edema present.      Left lower leg: Edema present.   Lymphadenopathy:      Cervical: No cervical adenopathy.   Skin:     General: Skin is warm and dry.   Neurological:      Mental Status: Mental status is at baseline.      Motor: Weakness present.      Gait: Gait abnormal.         Assessment/Plan  Problem List Items Addressed This Visit    None    Lab rechecks due may- order cbc cmp a1c  Continue to follow-up with psychiatry for paranoid schizophrenia.   Goals    None           Electronically Signed By: Mari Peters MD   4/8/25  5:58 PM

## 2025-04-08 ASSESSMENT — ENCOUNTER SYMPTOMS
HEMATURIA: 0
NERVOUS/ANXIOUS: 0
ABDOMINAL PAIN: 0
HEADACHES: 0
DYSURIA: 0
DIARRHEA: 0
DYSPHORIC MOOD: 0
CONFUSION: 1
COUGH: 1
CHILLS: 0
CONSTIPATION: 0
SHORTNESS OF BREATH: 1
FEVER: 0
PALPITATIONS: 0
FATIGUE: 1
WHEEZING: 0
NAUSEA: 0
DIZZINESS: 0
FREQUENCY: 0
SORE THROAT: 0
VOMITING: 0

## 2025-04-08 NOTE — PROGRESS NOTES
Subjective   Patient ID: Brianna Argueta is a 75 y.o. female who is long term resident being seen and evaluated for multiple medical problems.    HPI patient being seen for monthly visit.  She did have laboratory studies showed improved A1c 7.6, other labs normal. She is doing fine this month, edema flucutuating, no chest pain or shortness of breath.          Review of Systems   Constitutional:  Positive for fatigue. Negative for chills and fever.   HENT:  Positive for hearing loss. Negative for congestion and sore throat.    Eyes:  Negative for visual disturbance.   Respiratory:  Positive for cough and shortness of breath (At baseline). Negative for wheezing.    Cardiovascular:  Positive for leg swelling. Negative for chest pain and palpitations.   Gastrointestinal:  Negative for abdominal pain, constipation, diarrhea, nausea and vomiting.   Genitourinary:  Negative for dysuria, frequency, hematuria and urgency.   Musculoskeletal:  Positive for gait problem.   Skin:  Negative for rash.   Neurological:  Negative for dizziness, syncope and headaches.   Psychiatric/Behavioral:  Positive for confusion. Negative for dysphoric mood. The patient is not nervous/anxious.        Objective   There were no vitals taken for this visit.    Physical Exam  Vitals reviewed: 128/65 97.8 72 wt 208-down 3 lbs.   Constitutional:       General: She is not in acute distress.     Appearance: She is not ill-appearing.   HENT:      Head: Normocephalic.      Nose: No congestion.      Mouth/Throat:      Pharynx: Oropharynx is clear.   Eyes:      General: No scleral icterus.     Conjunctiva/sclera: Conjunctivae normal.   Cardiovascular:      Rate and Rhythm: Normal rate and regular rhythm.      Heart sounds:      No gallop.   Pulmonary:      Effort: Pulmonary effort is normal.      Breath sounds: No wheezing or rhonchi.   Abdominal:      General: Bowel sounds are normal.      Tenderness: There is no abdominal tenderness. There is no rebound.    Musculoskeletal:      Cervical back: Neck supple.      Right lower leg: Edema present.      Left lower leg: Edema present.   Lymphadenopathy:      Cervical: No cervical adenopathy.   Skin:     General: Skin is warm and dry.   Neurological:      Mental Status: Mental status is at baseline.      Motor: Weakness present.      Gait: Gait abnormal.         Assessment/Plan   Problem List Items Addressed This Visit    None    Lab rechecks due may- order cbc cmp a1c  Continue to follow-up with psychiatry for paranoid schizophrenia.   Goals    None

## 2025-04-22 ENCOUNTER — NURSING HOME VISIT (OUTPATIENT)
Dept: POST ACUTE CARE | Facility: EXTERNAL LOCATION | Age: 75
End: 2025-04-22
Payer: COMMERCIAL

## 2025-04-22 DIAGNOSIS — N18.32 TYPE 2 DIABETES MELLITUS WITH STAGE 3B CHRONIC KIDNEY DISEASE, WITH LONG-TERM CURRENT USE OF INSULIN (MULTI): Primary | ICD-10-CM

## 2025-04-22 DIAGNOSIS — Z79.4 TYPE 2 DIABETES MELLITUS WITH STAGE 3B CHRONIC KIDNEY DISEASE, WITH LONG-TERM CURRENT USE OF INSULIN (MULTI): Primary | ICD-10-CM

## 2025-04-22 DIAGNOSIS — E11.22 TYPE 2 DIABETES MELLITUS WITH STAGE 3B CHRONIC KIDNEY DISEASE, WITH LONG-TERM CURRENT USE OF INSULIN (MULTI): Primary | ICD-10-CM

## 2025-04-22 PROCEDURE — 99309 SBSQ NF CARE MODERATE MDM 30: CPT | Performed by: NURSE PRACTITIONER

## 2025-04-22 NOTE — LETTER
Patient: Brianna Argueta  : 1950    Encounter Date: 2025    Subjective  Patient ID: Brianna Argueta is a 75 y.o. female who presents for Diabetes.    Asked to see patient who has been having low blood sugars. These have been typically in the evening. Staff states that there has been no change in her appetite and eating. She states that she feels well. She denies feeling like her blood sugar is low at anytime. Staff will hold her insulin if her blood sugar is low. She is on both long and short acting insulins.          Review of Systems   All other systems reviewed and are negative.      Objective  /65   Pulse 72   Temp 36.6 °C (97.8 °F)   Resp 18   Wt 94.3 kg (208 lb)   SpO2 96%   BMI 36.85 kg/m²     Physical Exam  Constitutional:       General: She is not in acute distress.     Appearance: She is obese. She is not ill-appearing.   HENT:      Mouth/Throat:      Mouth: Mucous membranes are moist.   Eyes:      Conjunctiva/sclera: Conjunctivae normal.   Pulmonary:      Effort: Pulmonary effort is normal.   Musculoskeletal:      Comments: Shuffled gait   Skin:     General: Skin is warm and dry.   Neurological:      Mental Status: She is alert. Mental status is at baseline.   Psychiatric:         Mood and Affect: Affect is flat.         Assessment/Plan  Diagnoses and all orders for this visit:  Type 2 diabetes mellitus with stage 3b chronic kidney disease, with long-term current use of insulin (Multi)  Comments:  decrease novolog to 38 units qAC, cont with lantus, cont with accuchecks         Electronically Signed By: BELINDA Banerjee-CNP   25 12:55 PM

## 2025-04-23 VITALS
WEIGHT: 208 LBS | SYSTOLIC BLOOD PRESSURE: 128 MMHG | TEMPERATURE: 97.8 F | BODY MASS INDEX: 36.85 KG/M2 | RESPIRATION RATE: 18 BRPM | DIASTOLIC BLOOD PRESSURE: 65 MMHG | HEART RATE: 72 BPM | OXYGEN SATURATION: 96 %

## 2025-04-23 NOTE — PROGRESS NOTES
Subjective   Patient ID: Brianna Argueta is a 75 y.o. female who presents for Diabetes.    Asked to see patient who has been having low blood sugars. These have been typically in the evening. Staff states that there has been no change in her appetite and eating. She states that she feels well. She denies feeling like her blood sugar is low at anytime. Staff will hold her insulin if her blood sugar is low. She is on both long and short acting insulins.          Review of Systems   All other systems reviewed and are negative.      Objective   /65   Pulse 72   Temp 36.6 °C (97.8 °F)   Resp 18   Wt 94.3 kg (208 lb)   SpO2 96%   BMI 36.85 kg/m²     Physical Exam  Constitutional:       General: She is not in acute distress.     Appearance: She is obese. She is not ill-appearing.   HENT:      Mouth/Throat:      Mouth: Mucous membranes are moist.   Eyes:      Conjunctiva/sclera: Conjunctivae normal.   Pulmonary:      Effort: Pulmonary effort is normal.   Musculoskeletal:      Comments: Shuffled gait   Skin:     General: Skin is warm and dry.   Neurological:      Mental Status: She is alert. Mental status is at baseline.   Psychiatric:         Mood and Affect: Affect is flat.         Assessment/Plan   Diagnoses and all orders for this visit:  Type 2 diabetes mellitus with stage 3b chronic kidney disease, with long-term current use of insulin (Multi)  Comments:  decrease novolog to 38 units qAC, cont with lantus, cont with accuchecks

## 2025-05-05 ENCOUNTER — NURSING HOME VISIT (OUTPATIENT)
Dept: POST ACUTE CARE | Facility: EXTERNAL LOCATION | Age: 75
End: 2025-05-05
Payer: COMMERCIAL

## 2025-05-05 DIAGNOSIS — F20.0 PARANOID SCHIZOPHRENIA (MULTI): Primary | ICD-10-CM

## 2025-05-05 DIAGNOSIS — Z79.4 TYPE 2 DIABETES MELLITUS WITH HYPERGLYCEMIA, WITH LONG-TERM CURRENT USE OF INSULIN: ICD-10-CM

## 2025-05-05 DIAGNOSIS — I10 PRIMARY HYPERTENSION: ICD-10-CM

## 2025-05-05 DIAGNOSIS — E11.65 TYPE 2 DIABETES MELLITUS WITH HYPERGLYCEMIA, WITH LONG-TERM CURRENT USE OF INSULIN: ICD-10-CM

## 2025-05-05 DIAGNOSIS — E78.2 MIXED HYPERLIPIDEMIA: ICD-10-CM

## 2025-05-05 PROCEDURE — 99309 SBSQ NF CARE MODERATE MDM 30: CPT | Performed by: FAMILY MEDICINE

## 2025-05-05 NOTE — LETTER
Patient: Brianna Argueta  : 1950    Encounter Date: 2025    Subjective  Patient ID: Brianna Argueta is a 75 y.o. female who is long term resident being seen and evaluated for multiple medical problems.    HPI patient being seen for monthly visit.  She did have laboratory studies showed improved A1c 7.6, other labs normal. She is being seen by psychiatry for schizophrenia.  Diagnosis checks back to the age of 51 with records that are available to us.  Is due for complete laboratory study checks in  as last ones were done in March.  No other acute issues noted today.           Review of Systems   Constitutional:  Positive for fatigue. Negative for chills and fever.   HENT:  Negative for congestion and sore throat.    Eyes:  Negative for visual disturbance.   Respiratory:  Negative for cough, shortness of breath (At baseline) and wheezing.    Cardiovascular:  Positive for leg swelling. Negative for chest pain and palpitations.   Gastrointestinal:  Negative for abdominal pain, constipation, diarrhea, nausea and vomiting.   Genitourinary:  Negative for dysuria, frequency, hematuria and urgency.   Musculoskeletal:  Positive for gait problem.   Skin:  Negative for rash.   Neurological:  Negative for dizziness, syncope and headaches.   Psychiatric/Behavioral:  Positive for confusion. Negative for dysphoric mood. The patient is not nervous/anxious.        Objective  There were no vitals taken for this visit.    Physical Exam  Vitals reviewed: 124/51 temp 97.6 pulse 66 weight 211 which is up 3 pounds.   Constitutional:       General: She is not in acute distress.     Appearance: She is not ill-appearing.   HENT:      Head: Normocephalic.      Nose: No congestion.      Mouth/Throat:      Pharynx: Oropharynx is clear.   Eyes:      General: No scleral icterus.     Conjunctiva/sclera: Conjunctivae normal.   Cardiovascular:      Rate and Rhythm: Normal rate and regular rhythm.      Heart sounds:      No gallop.    Pulmonary:      Effort: Pulmonary effort is normal.      Breath sounds: No wheezing or rhonchi.   Abdominal:      General: Bowel sounds are normal.      Tenderness: There is no abdominal tenderness. There is no rebound.   Musculoskeletal:      Cervical back: Neck supple.      Right lower leg: Edema present.      Left lower leg: Edema present.   Lymphadenopathy:      Cervical: No cervical adenopathy.   Skin:     General: Skin is warm and dry.   Neurological:      Mental Status: Mental status is at baseline.      Motor: Weakness present.      Gait: Gait abnormal.         Assessment/Plan  Problem List Items Addressed This Visit           ICD-10-CM    Hyperlipidemia E78.5    Hypertension I10    Paranoid schizophrenia (Multi) - Primary F20.0    Type 2 diabetes mellitus with hyperglycemia, with long-term current use of insulin E11.65, Z79.4   CBC CMP A1c lipids ordered for Colleen 3, continue follow-up with psychiatry for schizophrenia diagnosis which appears to track back to the age of 51 per records available to us   Goals    None         Electronically Signed By: Mari Peters MD   5/6/25  1:58 PM

## 2025-05-06 ASSESSMENT — ENCOUNTER SYMPTOMS
DYSPHORIC MOOD: 0
WHEEZING: 0
NERVOUS/ANXIOUS: 0
COUGH: 0
NAUSEA: 0
PALPITATIONS: 0
FEVER: 0
HEADACHES: 0
DYSURIA: 0
SORE THROAT: 0
SHORTNESS OF BREATH: 0
HEMATURIA: 0
CHILLS: 0
ABDOMINAL PAIN: 0
FATIGUE: 1
FREQUENCY: 0
VOMITING: 0
DIZZINESS: 0
CONFUSION: 1
CONSTIPATION: 0
DIARRHEA: 0

## 2025-05-06 NOTE — PROGRESS NOTES
Subjective   Patient ID: Brianna Argueta is a 75 y.o. female who is long term resident being seen and evaluated for multiple medical problems.    HPI patient being seen for monthly visit.  She did have laboratory studies showed improved A1c 7.6, other labs normal. She is being seen by psychiatry for schizophrenia.  Diagnosis checks back to the age of 51 with records that are available to us.  Is due for complete laboratory study checks in June as last ones were done in March.  No other acute issues noted today.           Review of Systems   Constitutional:  Positive for fatigue. Negative for chills and fever.   HENT:  Negative for congestion and sore throat.    Eyes:  Negative for visual disturbance.   Respiratory:  Negative for cough, shortness of breath (At baseline) and wheezing.    Cardiovascular:  Positive for leg swelling. Negative for chest pain and palpitations.   Gastrointestinal:  Negative for abdominal pain, constipation, diarrhea, nausea and vomiting.   Genitourinary:  Negative for dysuria, frequency, hematuria and urgency.   Musculoskeletal:  Positive for gait problem.   Skin:  Negative for rash.   Neurological:  Negative for dizziness, syncope and headaches.   Psychiatric/Behavioral:  Positive for confusion. Negative for dysphoric mood. The patient is not nervous/anxious.        Objective   There were no vitals taken for this visit.    Physical Exam  Vitals reviewed: 124/51 temp 97.6 pulse 66 weight 211 which is up 3 pounds.   Constitutional:       General: She is not in acute distress.     Appearance: She is not ill-appearing.   HENT:      Head: Normocephalic.      Nose: No congestion.      Mouth/Throat:      Pharynx: Oropharynx is clear.   Eyes:      General: No scleral icterus.     Conjunctiva/sclera: Conjunctivae normal.   Cardiovascular:      Rate and Rhythm: Normal rate and regular rhythm.      Heart sounds:      No gallop.   Pulmonary:      Effort: Pulmonary effort is normal.      Breath sounds:  No wheezing or rhonchi.   Abdominal:      General: Bowel sounds are normal.      Tenderness: There is no abdominal tenderness. There is no rebound.   Musculoskeletal:      Cervical back: Neck supple.      Right lower leg: Edema present.      Left lower leg: Edema present.   Lymphadenopathy:      Cervical: No cervical adenopathy.   Skin:     General: Skin is warm and dry.   Neurological:      Mental Status: Mental status is at baseline.      Motor: Weakness present.      Gait: Gait abnormal.         Assessment/Plan   Problem List Items Addressed This Visit           ICD-10-CM    Hyperlipidemia E78.5    Hypertension I10    Paranoid schizophrenia (Multi) - Primary F20.0    Type 2 diabetes mellitus with hyperglycemia, with long-term current use of insulin E11.65, Z79.4   CBC CMP A1c lipids ordered for Colleen 3, continue follow-up with psychiatry for schizophrenia diagnosis which appears to track back to the age of 51 per records available to us   Goals    None

## 2025-06-02 ENCOUNTER — NURSING HOME VISIT (OUTPATIENT)
Dept: POST ACUTE CARE | Facility: EXTERNAL LOCATION | Age: 75
End: 2025-06-02
Payer: COMMERCIAL

## 2025-06-02 DIAGNOSIS — I10 PRIMARY HYPERTENSION: ICD-10-CM

## 2025-06-02 DIAGNOSIS — F31.75 BIPOLAR DISORDER, IN PARTIAL REMISSION, MOST RECENT EPISODE DEPRESSED (MULTI): ICD-10-CM

## 2025-06-02 DIAGNOSIS — Z79.4 TYPE 2 DIABETES MELLITUS WITH HYPERGLYCEMIA, WITH LONG-TERM CURRENT USE OF INSULIN: Primary | ICD-10-CM

## 2025-06-02 DIAGNOSIS — E78.2 MIXED HYPERLIPIDEMIA: ICD-10-CM

## 2025-06-02 DIAGNOSIS — E11.65 TYPE 2 DIABETES MELLITUS WITH HYPERGLYCEMIA, WITH LONG-TERM CURRENT USE OF INSULIN: Primary | ICD-10-CM

## 2025-06-02 DIAGNOSIS — I89.0 LYMPHEDEMA: ICD-10-CM

## 2025-06-02 PROCEDURE — 99309 SBSQ NF CARE MODERATE MDM 30: CPT | Performed by: FAMILY MEDICINE

## 2025-06-02 NOTE — LETTER
Patient: Brianna Argueta  : 1950    Encounter Date: 2025    Subjective  Patient ID: Brianna Argueta is a 75 y.o. female who is long term resident being seen and evaluated for multiple medical problems.    HPI patient being seen for monthly visit.  She has CBC CMP A1c and thyroid function ordered for next week.  1C was 7.6 on last check 3 months ago.  Sugar checks have been fairly stable here.  No issues per staff today.             Review of Systems   Constitutional:  Positive for fatigue. Negative for chills and fever.   HENT:  Negative for congestion and sore throat.    Eyes:  Negative for visual disturbance.   Respiratory:  Negative for cough, shortness of breath (At baseline) and wheezing.    Cardiovascular:  Positive for leg swelling. Negative for chest pain and palpitations.   Gastrointestinal:  Negative for abdominal pain, constipation, diarrhea, nausea and vomiting.   Genitourinary:  Negative for dysuria, frequency, hematuria and urgency.   Musculoskeletal:  Positive for gait problem.   Skin:  Negative for rash.   Neurological:  Negative for dizziness, syncope and headaches.   Psychiatric/Behavioral:  Positive for confusion. Negative for dysphoric mood. The patient is not nervous/anxious.        Objective  There were no vitals taken for this visit.    Physical Exam  Vitals reviewed: 122/78 temp 97.8 pulse 72 weight 210 which is down another pound.   Constitutional:       General: She is not in acute distress.     Appearance: She is not ill-appearing.   HENT:      Head: Normocephalic.      Nose: No congestion.      Mouth/Throat:      Pharynx: Oropharynx is clear.   Eyes:      General: No scleral icterus.     Conjunctiva/sclera: Conjunctivae normal.   Cardiovascular:      Rate and Rhythm: Normal rate and regular rhythm.      Heart sounds:      No gallop.   Pulmonary:      Effort: Pulmonary effort is normal.      Breath sounds: No wheezing or rhonchi.   Abdominal:      General: Bowel sounds are  normal.      Tenderness: There is no abdominal tenderness. There is no rebound.   Musculoskeletal:      Cervical back: Neck supple.      Right lower leg: Edema present.      Left lower leg: Edema present.   Lymphadenopathy:      Cervical: No cervical adenopathy.   Skin:     General: Skin is warm and dry.   Neurological:      Mental Status: Mental status is at baseline.      Motor: Weakness present.      Gait: Gait abnormal.         Assessment/Plan  Problem List Items Addressed This Visit    None    CBC CMP A1c lipids ordered for Colleen 3, continue follow-up with psychiatry for schizophrenia, occasion changes pending lab results   Goals    None         Electronically Signed By: Mari Peters MD   6/3/25  1:24 PM

## 2025-06-03 ASSESSMENT — ENCOUNTER SYMPTOMS
CONSTIPATION: 0
VOMITING: 0
NERVOUS/ANXIOUS: 0
FEVER: 0
HEADACHES: 0
HEMATURIA: 0
CONFUSION: 1
ABDOMINAL PAIN: 0
CHILLS: 0
NAUSEA: 0
COUGH: 0
WHEEZING: 0
FREQUENCY: 0
FATIGUE: 1
DIZZINESS: 0
SHORTNESS OF BREATH: 0
DIARRHEA: 0
PALPITATIONS: 0
DYSPHORIC MOOD: 0
DYSURIA: 0
SORE THROAT: 0

## 2025-06-03 NOTE — PROGRESS NOTES
Subjective   Patient ID: Brianna Argueta is a 75 y.o. female who is long term resident being seen and evaluated for multiple medical problems.    HPI patient being seen for monthly visit.  She has CBC CMP A1c and thyroid function ordered for next week.  1C was 7.6 on last check 3 months ago.  Sugar checks have been fairly stable here.  No issues per staff today.             Review of Systems   Constitutional:  Positive for fatigue. Negative for chills and fever.   HENT:  Negative for congestion and sore throat.    Eyes:  Negative for visual disturbance.   Respiratory:  Negative for cough, shortness of breath (At baseline) and wheezing.    Cardiovascular:  Positive for leg swelling. Negative for chest pain and palpitations.   Gastrointestinal:  Negative for abdominal pain, constipation, diarrhea, nausea and vomiting.   Genitourinary:  Negative for dysuria, frequency, hematuria and urgency.   Musculoskeletal:  Positive for gait problem.   Skin:  Negative for rash.   Neurological:  Negative for dizziness, syncope and headaches.   Psychiatric/Behavioral:  Positive for confusion. Negative for dysphoric mood. The patient is not nervous/anxious.        Objective   There were no vitals taken for this visit.    Physical Exam  Vitals reviewed: 122/78 temp 97.8 pulse 72 weight 210 which is down another pound.   Constitutional:       General: She is not in acute distress.     Appearance: She is not ill-appearing.   HENT:      Head: Normocephalic.      Nose: No congestion.      Mouth/Throat:      Pharynx: Oropharynx is clear.   Eyes:      General: No scleral icterus.     Conjunctiva/sclera: Conjunctivae normal.   Cardiovascular:      Rate and Rhythm: Normal rate and regular rhythm.      Heart sounds:      No gallop.   Pulmonary:      Effort: Pulmonary effort is normal.      Breath sounds: No wheezing or rhonchi.   Abdominal:      General: Bowel sounds are normal.      Tenderness: There is no abdominal tenderness. There is no  rebound.   Musculoskeletal:      Cervical back: Neck supple.      Right lower leg: Edema present.      Left lower leg: Edema present.   Lymphadenopathy:      Cervical: No cervical adenopathy.   Skin:     General: Skin is warm and dry.   Neurological:      Mental Status: Mental status is at baseline.      Motor: Weakness present.      Gait: Gait abnormal.         Assessment/Plan   Problem List Items Addressed This Visit    None    CBC CMP A1c lipids ordered for Colleen 3, continue follow-up with psychiatry for schizophrenia, occasion changes pending lab results   Goals    None

## 2025-06-05 ENCOUNTER — NURSING HOME VISIT (OUTPATIENT)
Dept: POST ACUTE CARE | Facility: EXTERNAL LOCATION | Age: 75
End: 2025-06-05
Payer: COMMERCIAL

## 2025-06-05 VITALS
WEIGHT: 210.6 LBS | RESPIRATION RATE: 18 BRPM | OXYGEN SATURATION: 96 % | SYSTOLIC BLOOD PRESSURE: 122 MMHG | DIASTOLIC BLOOD PRESSURE: 78 MMHG | TEMPERATURE: 97.8 F | HEART RATE: 72 BPM | BODY MASS INDEX: 37.31 KG/M2

## 2025-06-05 DIAGNOSIS — E11.65 TYPE 2 DIABETES MELLITUS WITH HYPERGLYCEMIA, WITH LONG-TERM CURRENT USE OF INSULIN: Primary | ICD-10-CM

## 2025-06-05 DIAGNOSIS — Z79.4 TYPE 2 DIABETES MELLITUS WITH HYPERGLYCEMIA, WITH LONG-TERM CURRENT USE OF INSULIN: Primary | ICD-10-CM

## 2025-06-05 PROCEDURE — 99309 SBSQ NF CARE MODERATE MDM 30: CPT | Performed by: NURSE PRACTITIONER

## 2025-06-05 NOTE — PROGRESS NOTES
Subjective   Patient ID: Brianna Argueta is a 75 y.o. female who presents for Diabetes.    Following up with patient who had a HGA1C done ysterday which was 8. This is up from her previous reading on 2/25 which was 7.6. She continues to take lantus, humalog and metformin for DM. She has some times when her blood sugar has gone low in the evenings and had to have the amount of insulin before meals adjusted. Her a=morning readings have been WNL. She eats well and denies any issues when her blood sugars are low         Review of Systems   All other systems reviewed and are negative.      Objective   /78   Pulse 72   Temp 36.6 °C (97.8 °F)   Resp 18   Wt 95.5 kg (210 lb 9.6 oz)   SpO2 96%   BMI 37.31 kg/m²     Physical Exam  Constitutional:       General: She is not in acute distress.     Appearance: She is not ill-appearing.   HENT:      Mouth/Throat:      Mouth: Mucous membranes are moist.   Pulmonary:      Effort: Pulmonary effort is normal.   Musculoskeletal:      Comments: Ambulatory with walker   Skin:     General: Skin is warm and dry.   Neurological:      Mental Status: She is alert. Mental status is at baseline.   Psychiatric:         Mood and Affect: Affect is flat.         Assessment/Plan   Diagnoses and all orders for this visit:  Type 2 diabetes mellitus with hyperglycemia, with long-term current use of insulin  Comments:  increase lantus to 20 units daily, cont with humalog before meals, Metformin ER, HGA1C in 3 months

## 2025-06-05 NOTE — LETTER
Patient: Brianna Argueta  : 1950    Encounter Date: 2025    Subjective  Patient ID: Brianna Argueta is a 75 y.o. female who presents for Diabetes.    Following up with patient who had a HGA1C done ysterday which was 8. This is up from her previous reading on  which was 7.6. She continues to take lantus, humalog and metformin for DM. She has some times when her blood sugar has gone low in the evenings and had to have the amount of insulin before meals adjusted. Her a=morning readings have been WNL. She eats well and denies any issues when her blood sugars are low         Review of Systems   All other systems reviewed and are negative.      Objective  /78   Pulse 72   Temp 36.6 °C (97.8 °F)   Resp 18   Wt 95.5 kg (210 lb 9.6 oz)   SpO2 96%   BMI 37.31 kg/m²     Physical Exam  Constitutional:       General: She is not in acute distress.     Appearance: She is not ill-appearing.   HENT:      Mouth/Throat:      Mouth: Mucous membranes are moist.   Pulmonary:      Effort: Pulmonary effort is normal.   Musculoskeletal:      Comments: Ambulatory with walker   Skin:     General: Skin is warm and dry.   Neurological:      Mental Status: She is alert. Mental status is at baseline.   Psychiatric:         Mood and Affect: Affect is flat.         Assessment/Plan  Diagnoses and all orders for this visit:  Type 2 diabetes mellitus with hyperglycemia, with long-term current use of insulin  Comments:  increase lantus to 20 units daily, cont with humalog before meals, Metformin ER, HGA1C in 3 months         Electronically Signed By: MEAGAN Banerjee   25  2:13 PM

## 2025-07-14 ENCOUNTER — NURSING HOME VISIT (OUTPATIENT)
Dept: POST ACUTE CARE | Facility: EXTERNAL LOCATION | Age: 75
End: 2025-07-14
Payer: COMMERCIAL

## 2025-07-14 DIAGNOSIS — Z79.4 TYPE 2 DIABETES MELLITUS WITH HYPERGLYCEMIA, WITH LONG-TERM CURRENT USE OF INSULIN: Primary | ICD-10-CM

## 2025-07-14 DIAGNOSIS — F20.0 PARANOID SCHIZOPHRENIA (MULTI): ICD-10-CM

## 2025-07-14 DIAGNOSIS — I10 PRIMARY HYPERTENSION: ICD-10-CM

## 2025-07-14 DIAGNOSIS — E11.65 TYPE 2 DIABETES MELLITUS WITH HYPERGLYCEMIA, WITH LONG-TERM CURRENT USE OF INSULIN: Primary | ICD-10-CM

## 2025-07-14 DIAGNOSIS — E78.2 MIXED HYPERLIPIDEMIA: ICD-10-CM

## 2025-07-14 PROCEDURE — 99309 SBSQ NF CARE MODERATE MDM 30: CPT | Performed by: FAMILY MEDICINE

## 2025-07-14 ASSESSMENT — ENCOUNTER SYMPTOMS
VOMITING: 0
COUGH: 0
HEMATURIA: 0
FREQUENCY: 0
HEADACHES: 0
SORE THROAT: 0
NERVOUS/ANXIOUS: 0
DYSURIA: 0
FATIGUE: 1
CHILLS: 0
NAUSEA: 0
FEVER: 0
CONSTIPATION: 0
SHORTNESS OF BREATH: 0
PALPITATIONS: 0
DIARRHEA: 0
DIZZINESS: 0
CONFUSION: 1
WHEEZING: 0
DYSPHORIC MOOD: 0
ABDOMINAL PAIN: 0

## 2025-07-14 NOTE — LETTER
Patient: Brianna Argueta  : 1950    Encounter Date: 2025    Subjective  Patient ID: Brianna Argueta is a 75 y.o. female who is long term resident being seen and evaluated for multiple medical problems.    HPI patient being seen for monthly visit.  Labs reviewed from - meds adjusted and rechecks due in September. She has had some increase in behaviors. No issues per staff today.             Review of Systems   Constitutional:  Positive for fatigue. Negative for chills and fever.   HENT:  Negative for congestion and sore throat.    Eyes:  Negative for visual disturbance.   Respiratory:  Negative for cough, shortness of breath (At baseline) and wheezing.    Cardiovascular:  Positive for leg swelling. Negative for chest pain and palpitations.   Gastrointestinal:  Negative for abdominal pain, constipation, diarrhea, nausea and vomiting.   Genitourinary:  Negative for dysuria, frequency, hematuria and urgency.   Musculoskeletal:  Positive for gait problem.   Skin:  Negative for rash.   Neurological:  Negative for dizziness, syncope and headaches.   Psychiatric/Behavioral:  Positive for confusion. Negative for dysphoric mood. The patient is not nervous/anxious.        Objective  There were no vitals taken for this visit.    Physical Exam  Vitals reviewed: 117/64 97.9 72 wt 209-down 1 lb.   Constitutional:       General: She is not in acute distress.     Appearance: She is not ill-appearing.   HENT:      Head: Normocephalic.      Nose: No congestion.      Mouth/Throat:      Pharynx: Oropharynx is clear.   Eyes:      General: No scleral icterus.     Conjunctiva/sclera: Conjunctivae normal.   Cardiovascular:      Rate and Rhythm: Normal rate and regular rhythm.      Heart sounds:      No gallop.   Pulmonary:      Effort: Pulmonary effort is normal.      Breath sounds: No wheezing or rhonchi.   Abdominal:      General: Bowel sounds are normal.      Tenderness: There is no abdominal tenderness. There is no  rebound.   Musculoskeletal:      Cervical back: Neck supple.      Right lower leg: Edema present.      Left lower leg: Edema present.   Lymphadenopathy:      Cervical: No cervical adenopathy.   Skin:     General: Skin is warm and dry.   Neurological:      Mental Status: Mental status is at baseline.      Motor: Weakness present.      Gait: Gait abnormal.         Assessment/Plan  Problem List Items Addressed This Visit           ICD-10-CM    Hyperlipidemia E78.5    Hypertension I10    Paranoid schizophrenia (Multi) F20.0    Type 2 diabetes mellitus with hyperglycemia, with long-term current use of insulin - Primary E11.65, Z79.4     CBC CMP A1c lipids reviewed from last month- recheck a2c in sept with increase in meds      Electronically Signed By: Mari Peters MD   7/14/25 11:51 PM

## 2025-07-15 NOTE — PROGRESS NOTES
Subjective   Patient ID: Brianna Argueta is a 75 y.o. female who is long term resident being seen and evaluated for multiple medical problems.    HPI patient being seen for monthly visit.  Labs reviewed from June- meds adjusted and rechecks due in September. She has had some increase in behaviors. No issues per staff today.             Review of Systems   Constitutional:  Positive for fatigue. Negative for chills and fever.   HENT:  Negative for congestion and sore throat.    Eyes:  Negative for visual disturbance.   Respiratory:  Negative for cough, shortness of breath (At baseline) and wheezing.    Cardiovascular:  Positive for leg swelling. Negative for chest pain and palpitations.   Gastrointestinal:  Negative for abdominal pain, constipation, diarrhea, nausea and vomiting.   Genitourinary:  Negative for dysuria, frequency, hematuria and urgency.   Musculoskeletal:  Positive for gait problem.   Skin:  Negative for rash.   Neurological:  Negative for dizziness, syncope and headaches.   Psychiatric/Behavioral:  Positive for confusion. Negative for dysphoric mood. The patient is not nervous/anxious.        Objective   There were no vitals taken for this visit.    Physical Exam  Vitals reviewed: 117/64 97.9 72 wt 209-down 1 lb.   Constitutional:       General: She is not in acute distress.     Appearance: She is not ill-appearing.   HENT:      Head: Normocephalic.      Nose: No congestion.      Mouth/Throat:      Pharynx: Oropharynx is clear.   Eyes:      General: No scleral icterus.     Conjunctiva/sclera: Conjunctivae normal.   Cardiovascular:      Rate and Rhythm: Normal rate and regular rhythm.      Heart sounds:      No gallop.   Pulmonary:      Effort: Pulmonary effort is normal.      Breath sounds: No wheezing or rhonchi.   Abdominal:      General: Bowel sounds are normal.      Tenderness: There is no abdominal tenderness. There is no rebound.   Musculoskeletal:      Cervical back: Neck supple.      Right  lower leg: Edema present.      Left lower leg: Edema present.   Lymphadenopathy:      Cervical: No cervical adenopathy.   Skin:     General: Skin is warm and dry.   Neurological:      Mental Status: Mental status is at baseline.      Motor: Weakness present.      Gait: Gait abnormal.         Assessment/Plan   Problem List Items Addressed This Visit           ICD-10-CM    Hyperlipidemia E78.5    Hypertension I10    Paranoid schizophrenia (Multi) F20.0    Type 2 diabetes mellitus with hyperglycemia, with long-term current use of insulin - Primary E11.65, Z79.4     CBC CMP A1c lipids reviewed from last month- recheck a2c in sept with increase in meds

## 2025-08-11 ENCOUNTER — NURSING HOME VISIT (OUTPATIENT)
Dept: POST ACUTE CARE | Facility: EXTERNAL LOCATION | Age: 75
End: 2025-08-11
Payer: COMMERCIAL

## 2025-08-11 DIAGNOSIS — Z79.4 TYPE 2 DIABETES MELLITUS WITH HYPERGLYCEMIA, WITH LONG-TERM CURRENT USE OF INSULIN: ICD-10-CM

## 2025-08-11 DIAGNOSIS — I89.0 LYMPHEDEMA: Primary | ICD-10-CM

## 2025-08-11 DIAGNOSIS — F20.0 PARANOID SCHIZOPHRENIA (MULTI): ICD-10-CM

## 2025-08-11 DIAGNOSIS — E78.2 MIXED HYPERLIPIDEMIA: ICD-10-CM

## 2025-08-11 DIAGNOSIS — I10 PRIMARY HYPERTENSION: ICD-10-CM

## 2025-08-11 DIAGNOSIS — E11.65 TYPE 2 DIABETES MELLITUS WITH HYPERGLYCEMIA, WITH LONG-TERM CURRENT USE OF INSULIN: ICD-10-CM

## 2025-08-11 PROCEDURE — 99309 SBSQ NF CARE MODERATE MDM 30: CPT | Performed by: FAMILY MEDICINE

## 2025-08-12 ASSESSMENT — ENCOUNTER SYMPTOMS
NAUSEA: 0
DIZZINESS: 0
CHILLS: 0
ABDOMINAL PAIN: 0
WHEEZING: 0
HEADACHES: 0
NERVOUS/ANXIOUS: 0
FREQUENCY: 0
DYSURIA: 0
HEMATURIA: 0
DIARRHEA: 0
CONSTIPATION: 0
SHORTNESS OF BREATH: 0
FEVER: 0
SORE THROAT: 0
CONFUSION: 1
DYSPHORIC MOOD: 0
PALPITATIONS: 0
VOMITING: 0
FATIGUE: 1
COUGH: 0

## 2025-09-04 ENCOUNTER — NURSING HOME VISIT (OUTPATIENT)
Dept: POST ACUTE CARE | Facility: EXTERNAL LOCATION | Age: 75
End: 2025-09-04
Payer: COMMERCIAL

## 2025-09-04 VITALS
RESPIRATION RATE: 18 BRPM | HEART RATE: 80 BPM | OXYGEN SATURATION: 97 % | BODY MASS INDEX: 36.99 KG/M2 | TEMPERATURE: 97.8 F | DIASTOLIC BLOOD PRESSURE: 82 MMHG | WEIGHT: 208.8 LBS | SYSTOLIC BLOOD PRESSURE: 132 MMHG

## 2025-09-04 DIAGNOSIS — H10.32 ACUTE BACTERIAL CONJUNCTIVITIS OF LEFT EYE: Primary | ICD-10-CM

## 2025-09-04 PROCEDURE — 99309 SBSQ NF CARE MODERATE MDM 30: CPT | Performed by: NURSE PRACTITIONER
